# Patient Record
Sex: FEMALE | NOT HISPANIC OR LATINO | Employment: OTHER | URBAN - METROPOLITAN AREA
[De-identification: names, ages, dates, MRNs, and addresses within clinical notes are randomized per-mention and may not be internally consistent; named-entity substitution may affect disease eponyms.]

---

## 2020-01-06 ENCOUNTER — OFFICE VISIT (OUTPATIENT)
Dept: OBGYN | Facility: CLINIC | Age: 75
End: 2020-01-06
Attending: OBSTETRICS & GYNECOLOGY
Payer: COMMERCIAL

## 2020-01-06 VITALS
WEIGHT: 135 LBS | BODY MASS INDEX: 24.69 KG/M2 | HEART RATE: 76 BPM | SYSTOLIC BLOOD PRESSURE: 138 MMHG | DIASTOLIC BLOOD PRESSURE: 79 MMHG

## 2020-01-06 DIAGNOSIS — Z01.42 SURVEILLANCE PAP FOLLOWING ABNORMAL VAGINAL PAP: ICD-10-CM

## 2020-01-06 DIAGNOSIS — L98.9 DISORDER OF SKIN OF VULVA: Primary | ICD-10-CM

## 2020-01-06 PROBLEM — K29.91 GASTROINTESTINAL HEMORRHAGE ASSOCIATED WITH GASTRODUODENITIS: Status: ACTIVE | Noted: 2017-06-16

## 2020-01-06 PROBLEM — K63.3 ULCERATION OF COLON DETERMINED BY ENDOSCOPY: Status: ACTIVE | Noted: 2019-02-19

## 2020-01-06 PROBLEM — Z87.440 HISTORY OF RECURRENT UTI (URINARY TRACT INFECTION): Status: ACTIVE | Noted: 2019-02-19

## 2020-01-06 PROBLEM — E87.6 ACUTE HYPOKALEMIA: Status: ACTIVE | Noted: 2019-02-19

## 2020-01-06 PROBLEM — D36.9 ADENOMATOUS POLYP: Status: ACTIVE | Noted: 2019-02-23

## 2020-01-06 PROCEDURE — 87624 HPV HI-RISK TYP POOLED RSLT: CPT | Performed by: OBSTETRICS & GYNECOLOGY

## 2020-01-06 PROCEDURE — 88175 CYTOPATH C/V AUTO FLUID REDO: CPT | Performed by: OBSTETRICS & GYNECOLOGY

## 2020-01-06 PROCEDURE — 88305 TISSUE EXAM BY PATHOLOGIST: CPT | Performed by: OBSTETRICS & GYNECOLOGY

## 2020-01-06 PROCEDURE — 56606 BIOPSY OF VULVA/PERINEUM: CPT | Mod: ZF | Performed by: OBSTETRICS & GYNECOLOGY

## 2020-01-06 PROCEDURE — G0463 HOSPITAL OUTPT CLINIC VISIT: HCPCS | Mod: ZF

## 2020-01-06 PROCEDURE — 56605 BIOPSY OF VULVA/PERINEUM: CPT | Mod: ZF | Performed by: OBSTETRICS & GYNECOLOGY

## 2020-01-06 RX ORDER — HYDROCHLOROTHIAZIDE 25 MG/1
25 TABLET ORAL DAILY
COMMUNITY

## 2020-01-06 NOTE — NURSING NOTE
Chief Complaint   Patient presents with     Establish Care     C/O possible white lesions on vulva   Pam John LPN

## 2020-01-06 NOTE — PROGRESS NOTES
Progress Note    SUBJECTIVE:  Erika Kelly is an 74 year old female with T12 paraplegia 6 years ago from [facility] with neurogenic bladder, recurrent UTI's with self catheterization. She reports noting some blood and feeling raw in the urethral area in the last 1 1/2 years; she has been going to 's for this and now wants to transfer care to the [facility].  She was on Estrogen cream (now discontinued), Hiprex and a vaseline type product.  She saw ID for this in June who recommend abx and Vit C, cranberry and estrogen cream and has been doing well since October (no UTI's). She sometimes has difficulty finding her urethral orifice with the catheter.  However she noted a white patch on her vulva that she was concerned about.   She is s/p cone bx x2 and RACHANA.     Last    Lab Results   Component Value Date    PAP LSIL 07/06/2011   Pap 2011: LSIL cannot exlude HGSIL.  Colpo 2011: vaginal bx: benign squamous epithelium.  She does not remember this or any follow up to this.         HISTORY:  Prescription Medications as of 1/6/2020       Rx Number Disp Refills Start End Last Dispensed Date Next Fill Date Owning Pharmacy    calcium carbonate (OS-ROSIE 500 MG [place]. [place]) 500 MG tablet            Sig: Take 500 mg by mouth 2 times daily    Class: Historical    Route: Oral    Cholecalciferol (VITAMIN D PO)            Sig: Take 1 tablet by mouth daily.    Class: Historical    Route: Oral    GABAPENTIN PO            Sig: Take 100 mg by mouth 3 times daily    Class: Historical    Route: Oral    methenamine hippurate (HIPREX) 1 G TABS            Sig: Take 1 g by mouth 2 times daily    Class: Historical    Route: Oral    WARFARIN SODIUM PO            Sig: Take 2 mg by mouth 2 times daily    Class: Historical    Route: Oral        No Known Allergies    There is no immunization history on file for this patient.    OB History   No obstetric history on file.     No past medical history on file.  Past Surgical History:   Procedure Laterality Date      BACK SURGERY  2014     CONIZATION       HYSTERECTOMY, RACHANA       Family History   Problem Relation Age of Onset     Diabetes Son      Cancer Mother      Social History     Socioeconomic History     Marital status: Single     Spouse name: Not on file     Number of children: Not on file     Years of education: Not on file     Highest education level: Not on file   Occupational History     Not on file   Social Needs     Financial resource strain: Not on file     Food insecurity:     Worry: Not on file     Inability: Not on file     Transportation needs:     Medical: Not on file     Non-medical: Not on file   Tobacco Use     Smoking status: Never Smoker     Smokeless tobacco: Never Used   Substance and Sexual Activity     Alcohol use: No     Drug use: No     Sexual activity: Not on file   Lifestyle     Physical activity:     Days per week: Not on file     Minutes per session: Not on file     Stress: Not on file   Relationships     Social connections:     Talks on phone: Not on file     Gets together: Not on file     Attends Jew service: Not on file     Active member of club or organization: Not on file     Attends meetings of clubs or organizations: Not on file     Relationship status: Not on file     Intimate partner violence:     Fear of current or ex partner: Not on file     Emotionally abused: Not on file     Physically abused: Not on file     Forced sexual activity: Not on file   Other Topics Concern     Parent/sibling w/ CABG, MI or angioplasty before 65F 55M? Not Asked   Social History Narrative    ** Merged History Encounter **            ROS: Hair loss high blood pressure flank pain tremors stiffness rosacea    EXAM:  Blood pressure 138/79, pulse 76, weight 61.2 kg (135 lb), not currently breastfeeding. Body mass index is 24.69 kg/m .  General - pleasant female in no acute distress.      Pelvic Exam:  EG/BUS: very atrophic, cobblestone type white patches on inner right labia minora,  Ulceration to left  "of urethra. Linear white patch to left of ulcer.   Urethral meatus: did not visualize well  Urethra: no masses, tenderness, or scarring   Bladder: no masses or tenderness   Vagina: atrophic, thin, dry, narrow introitus, pediatric speculum used. Cuff and vagina Palpates normal  Cervix: surgically absent  Uterus: surgically absent  Adnexa: no masses      ASSESSMENT:  Encounter Diagnoses   Name Primary?     Surveillance Pap following abnormal vaginal Pap      Disorder of skin of vulva Yes    Apparently no follow up after abnormal vaginal pap  Ulceration may be from difficulty with self cath.    PLAN:   Orders Placed This Encounter   Procedures     Biopsy Vulva/Perineum, One Lesion [[id number]]     Biopsy Vulva/Perineum, Each Additional Lesion [[id number]]     Pap imaged thin layer diagnostic with HPV     HPV High Risk Types DNA Cervical     Surgical pathology exam                  Vulvar Punch Biopsy Procedure:    Time Out - \"Pause for the Cause\"  Just before the procedure begins, through verbal and active participation of team members, verify:   Initials   Patient Name erick   Patient  erick   Procedure to be performed erick     Preoperative Diagnosis: vulvar disorder   Postoperative Diagnosis:  same    Consent:   Risks,. Written consent signed and scanned    Skin Preparation:  Betadine to left lesion    Anesthesia:none      A 3 mm punch biopsy obtained at left urethra and left of ulcer.  A second biopsy to right of urethra performed. Specimens were placed in labeled Formalin Jar.    EBL:  1ml  Stasis achieved with silver nitrate and pressure. Complications:  None    Specimens sent to Pathology.  Will call with pathology results and recommended follow-up.     Tolerance of Procedure: Patient did tolerate the procedure well.       "

## 2020-01-06 NOTE — LETTER
1/6/2020       RE: Erika Kelly  [address] [place] [address]     Dear Colleague,    Thank you for referring your patient, Erika Kelly, to the [facility] at [facility]. Please see a copy of my visit note below.    Progress Note    SUBJECTIVE:  Erika Kelly is an 74 year old female with T12 paraplegia 6 years ago from [facility] with neurogenic bladder, recurrent UTI's with self catheterization. She reports noting some blood and feeling raw in the urethral area in the last 1 1/2 years; she has been going to 's for this and now wants to transfer care to the [facility].  She was on Estrogen cream (now discontinued), Hiprex and a vaseline type product.  She saw ID for this in June who recommend abx and Vit C, cranberry and estrogen cream and has been doing well since October (no UTI's). She sometimes has difficulty finding her urethral orifice with the catheter.  However she noted a white patch on her vulva that she was concerned about.   She is s/p cone bx x2 and RACHANA.     Last    Lab Results   Component Value Date    PAP LSIL 07/06/2011   Pap 2011: LSIL cannot exlude HGSIL.  Colpo 2011: vaginal bx: benign squamous epithelium.  She does not remember this or any follow up to this.         HISTORY:  Prescription Medications as of 1/6/2020       Rx Number Disp Refills Start End Last Dispensed Date Next Fill Date Owning Pharmacy    calcium carbonate (OS-ROSIE 500 MG [place]. [place]) 500 MG tablet            Sig: Take 500 mg by mouth 2 times daily    Class: Historical    Route: Oral    Cholecalciferol (VITAMIN D PO)            Sig: Take 1 tablet by mouth daily.    Class: Historical    Route: Oral    GABAPENTIN PO            Sig: Take 100 mg by mouth 3 times daily    Class: Historical    Route: Oral    methenamine hippurate (HIPREX) 1 G TABS            Sig: Take 1 g by mouth 2 times daily    Class: Historical    Route: Oral    WARFARIN SODIUM PO             Sig: Take 2 mg by mouth 2 times daily    Class: Historical    Route: Oral        No Known Allergies    There is no immunization history on file for this patient.    OB History   No obstetric history on file.     No past medical history on file.  Past Surgical History:   Procedure Laterality Date     BACK SURGERY  2014     CONIZATION       HYSTERECTOMY, RACHANA       Family History   Problem Relation Age of Onset     Diabetes Son      Cancer Mother      Social History     Socioeconomic History     Marital status: Single     Spouse name: Not on file     Number of children: Not on file     Years of education: Not on file     Highest education level: Not on file   Occupational History     Not on file   Social Needs     Financial resource strain: Not on file     Food insecurity:     Worry: Not on file     Inability: Not on file     Transportation needs:     Medical: Not on file     Non-medical: Not on file   Tobacco Use     Smoking status: Never Smoker     Smokeless tobacco: Never Used   Substance and Sexual Activity     Alcohol use: No     Drug use: No     Sexual activity: Not on file   Lifestyle     Physical activity:     Days per week: Not on file     Minutes per session: Not on file     Stress: Not on file   Relationships     Social connections:     Talks on phone: Not on file     Gets together: Not on file     Attends Christianity service: Not on file     Active member of club or organization: Not on file     Attends meetings of clubs or organizations: Not on file     Relationship status: Not on file     Intimate partner violence:     Fear of current or ex partner: Not on file     Emotionally abused: Not on file     Physically abused: Not on file     Forced sexual activity: Not on file   Other Topics Concern     Parent/sibling w/ CABG, MI or angioplasty before 65F 55M? Not Asked   Social History Narrative    ** Merged History Encounter **          ROS: Hair loss high blood pressure flank pain tremors stiffness  "rosacea    EXAM:  Blood pressure 138/79, pulse 76, weight 61.2 kg (135 lb), not currently breastfeeding. Body mass index is 24.69 kg/m .  General - pleasant female in no acute distress.      Pelvic Exam:  EG/BUS: very atrophic, cobblestone type white patches on inner right labia minora,  Ulceration to left of urethra. Linear white patch to left of ulcer.   Urethral meatus: did not visualize well  Urethra: no masses, tenderness, or scarring   Bladder: no masses or tenderness   Vagina: atrophic, thin, dry, narrow introitus, pediatric speculum used. Cuff and vagina Palpates normal  Cervix: surgically absent  Uterus: surgically absent  Adnexa: no masses      ASSESSMENT:  Encounter Diagnoses   Name Primary?     Surveillance Pap following abnormal vaginal Pap      Disorder of skin of vulva Yes    Apparently no follow up after abnormal vaginal pap  Ulceration may be from difficulty with self cath.    PLAN:   Orders Placed This Encounter   Procedures     Biopsy Vulva/Perineum, One Lesion [[id number]]     Biopsy Vulva/Perineum, Each Additional Lesion [[id number]]     Pap imaged thin layer diagnostic with HPV     HPV High Risk Types DNA Cervical     Surgical pathology exam      Vulvar Punch Biopsy Procedure:    Time Out - \"Pause for the Cause\"  Just before the procedure begins, through verbal and active participation of team members, verify:   Initials   Patient Name erick   Patient  erick   Procedure to be performed erick     Preoperative Diagnosis: vulvar disorder   Postoperative Diagnosis:  same    Consent:   Risks,. Written consent signed and scanned    Skin Preparation:  Betadine to left lesion    Anesthesia:none      A 3 mm punch biopsy obtained at left urethra and left of ulcer.  A second biopsy to right of urethra performed. Specimens were placed in labeled Formalin Jar.    EBL:  1ml  Stasis achieved with silver nitrate and pressure. Complications:  None    Specimens sent to Pathology.  Will call with pathology results and " recommended follow-up.     Tolerance of Procedure: Patient did tolerate the procedure well.     Lexi Taylor MD

## 2020-01-08 LAB — COPATH REPORT: NORMAL

## 2020-01-11 LAB
COPATH REPORT: ABNORMAL
PAP: ABNORMAL

## 2020-01-14 LAB
FINAL DIAGNOSIS: NORMAL
HPV HR 12 DNA CVX QL NAA+PROBE: NEGATIVE
HPV16 DNA SPEC QL NAA+PROBE: NEGATIVE
HPV18 DNA SPEC QL NAA+PROBE: NEGATIVE
SPECIMEN DESCRIPTION: NORMAL
SPECIMEN SOURCE CVX/VAG CYTO: NORMAL

## 2020-01-16 DIAGNOSIS — N94.89: ICD-10-CM

## 2020-01-16 DIAGNOSIS — R87.629 VAGINAL PAP SMEAR, ABNORMAL: Primary | ICD-10-CM

## 2020-01-16 NOTE — PROGRESS NOTES
I called the pt. today to inform her of   Her abnormal vaginal Pap smear which had remained no worse than her prior vaginal Pap smear.  I would like to refer her to GYN oncology to get their input as to how to manage this.  Order placed.  In terms of her sam-urethral ulcer from self caths I would like to refer her to the wound clinic.    I did tell her that her vulvar biopsy result returned possible  Lichen sclerosis.  This visually did not appear to be lichen sclerosis.  I suggested that we avoid any topical treatment at this time and she should return yearly to reexamine this.

## 2020-01-29 NOTE — TELEPHONE ENCOUNTER
ONCOLOGY INTAKE: Records Information      APPT INFORMATION: [id number] Dr. Job Wick  Referring provider:  Dr. Lexi Taylor  Referring provider s clinic:  [facility]  Reason for visit/diagnosis:  Vaginal Pap smear, abnormal [R87.659]  Has patient been notified of appointment date and time?: Yes    RECORDS INFORMATION:  Were the records received with the referral (via [employer])? Internal Referral    Has patient been seen for any external appt for this diagnosis? No    If yes, where? NA    Has patient had any imaging or procedures outside of [place]  [place] for this condition? No      If Yes, where? NA    ADDITIONAL INFORMATION:  None

## 2020-01-30 NOTE — TELEPHONE ENCOUNTER
RECORDS STATUS - ALL OTHER DIAGNOSIS      RECORDS RECEIVED FROM: [facility] - Internal Referral   DATE RECEIVED: 1/30/20   NOTES STATUS DETAILS   OFFICE NOTE from referring provider Lexi Knutson MD   OFFICE NOTE from medical oncologist     DISCHARGE SUMMARY from hospital     DISCHARGE REPORT from the ER     OPERATIVE REPORT     MEDICATION LIST     CLINICAL TRIAL TREATMENTS TO DATE     LABS     PATHOLOGY REPORTS [facility]  1/6/20   ANYTHING RELATED TO DIAGNOSIS Epic 1/6/20   GENONOMIC TESTING     TYPE:     IMAGING (NEED IMAGES & REPORT)     CT SCANS     MRI     MAMMO     ULTRASOUND     PET

## 2020-02-19 ENCOUNTER — ONCOLOGY VISIT (OUTPATIENT)
Dept: ONCOLOGY | Facility: CLINIC | Age: 75
End: 2020-02-19
Attending: OBSTETRICS & GYNECOLOGY
Payer: COMMERCIAL

## 2020-02-19 ENCOUNTER — PRE VISIT (OUTPATIENT)
Dept: ONCOLOGY | Facility: CLINIC | Age: 75
End: 2020-02-19

## 2020-02-19 VITALS
HEART RATE: 72 BPM | TEMPERATURE: 97.7 F | DIASTOLIC BLOOD PRESSURE: 88 MMHG | RESPIRATION RATE: 16 BRPM | SYSTOLIC BLOOD PRESSURE: 143 MMHG | OXYGEN SATURATION: 99 %

## 2020-02-19 DIAGNOSIS — R87.629 VAGINAL PAP SMEAR, ABNORMAL: ICD-10-CM

## 2020-02-19 PROCEDURE — G0463 HOSPITAL OUTPT CLINIC VISIT: HCPCS | Mod: ZF

## 2020-02-19 PROCEDURE — 99205 OFFICE O/P NEW HI 60 MIN: CPT | Mod: 25 | Performed by: OBSTETRICS & GYNECOLOGY

## 2020-02-19 PROCEDURE — 88305 TISSUE EXAM BY PATHOLOGIST: CPT | Performed by: OBSTETRICS & GYNECOLOGY

## 2020-02-19 PROCEDURE — 11105 PUNCH BX SKIN EA SEP/ADDL: CPT

## 2020-02-19 PROCEDURE — 11105 PUNCH BX SKIN EA SEP/ADDL: CPT | Performed by: OBSTETRICS & GYNECOLOGY

## 2020-02-19 PROCEDURE — 11104 PUNCH BX SKIN SINGLE LESION: CPT

## 2020-02-19 PROCEDURE — 11104 PUNCH BX SKIN SINGLE LESION: CPT | Performed by: OBSTETRICS & GYNECOLOGY

## 2020-02-19 ASSESSMENT — PAIN SCALES - GENERAL: PAINLEVEL: EXTREME PAIN (8)

## 2020-02-19 NOTE — Clinical Note
2/19/2020       RE: Erika Kelly  [address] [place] [address]     Dear Colleague,    Thank you for referring your patient, Erika Kelly, to the [facility]. Please see a copy of my visit note below.    No notes on file    Again, thank you for allowing me to participate in the care of your patient.      Sincerely,    Job Wick MD
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
## 2020-02-19 NOTE — NURSING NOTE
Prior to the start of the procedure and with procedural staff participation, I verbally confirmed the patient s identity using two indicators, relevant allergies, that the procedure was appropriate and matched the consent or emergent situation, and that the correct equipment/implants were available. Immediately prior to starting the procedure I conducted the Time Out with the procedural staff and re-confirmed the patient s name, procedure, and site/side. (The Joint Commission universal protocol was followed.)  Yes    Sedation (Moderate or Deep): None    Post procedure pain: 0    Ivette Beyer RN

## 2020-02-19 NOTE — NURSING NOTE
"Oncology Rooming Note    February 19, 2020 11:48 AM   Erika Kelly is a 74 year old female who presents for:    Chief Complaint   Patient presents with     Oncology Clinic Visit     New; Abnormal Pap     Initial Vitals: BP (!) 143/88   Pulse 72   Temp 97.7  F (36.5  C) (Oral)   Resp 16   SpO2 99%  Estimated body mass index is 24.69 kg/m  as calculated from the following:    Height as of 9/4/14: 1.575 m (5' 2\").    Weight as of 1/6/20: 61.2 kg (135 lb). There is no height or weight on file to calculate BSA.  Extreme Pain (8) Comment: all over body pain   No LMP recorded. Patient has had a hysterectomy.  Allergies reviewed: Yes  Medications reviewed: Yes    Medications: Medication refills not needed today.  Pharmacy name entered into [employer]: [id number] [address] AT [facility]    Clinical concerns: abnormal pap       Yolanda Myers CMA              "

## 2020-02-20 ENCOUNTER — PATIENT OUTREACH (OUTPATIENT)
Dept: CARE COORDINATION | Facility: CLINIC | Age: 75
End: 2020-02-20

## 2020-02-20 LAB — COPATH REPORT: NORMAL

## 2020-02-20 NOTE — PROGRESS NOTES
Oncology Distress Screening Follow-up  Clinical Social Work  [facility]    Identified Concern and Score From Distress Screenin. How concerned are you about knowing what resources are available to help you?   5  patient would like to speak to socail worker about disability           9. If you want to be contacted by one of our professionals, I can send a message to them right now.   Oncology Social  Worker                 Date of Distress Screenin2020      Data: Erika Kelyl 74 year old woman with a recent diagnosis of LSIL and possible lichen sclerosis.  Given these findings she was subsequently sent to the Gynecologic Cancer Clinic for new patient consultation.       Intervention/Education Provided:: Patient requested assistance and information about applying for disability. Patient explained their circumstances for wanting to apply for disability.  provided supportive listening and validation of patient's feelings and experiences. Patient also expressed interest in home health care and described the assistance they were seeking.  Per patient's request,  provided patient with phone numbers to cancer legal line, disability linkage line, senior linkage line an talked with patient about calling their insurance provider about coverage they provide for home health care.  also encourage patient to follow up with their doctor about prescribing home health care order based on the needs they are describing. Patient plans to call phone numbers  provided and has 's contact information for continued follow up as needed.       Follow-up Required:  will remain available as needed.       Gretel CASTANEDA, ABRAHAM  - Oncology  Phone : [phone]  Pager: [phone]

## 2020-03-03 ENCOUNTER — DOCUMENTATION ONLY (OUTPATIENT)
Dept: CARE COORDINATION | Facility: CLINIC | Age: 75
End: 2020-03-03

## 2020-03-13 ENCOUNTER — TELEPHONE (OUTPATIENT)
Dept: INTERNAL MEDICINE | Facility: CLINIC | Age: 75
End: 2020-03-13

## 2020-03-13 NOTE — TELEPHONE ENCOUNTER
Called the patient and assured we are doing everything to protect all patients and providers Jaqueline Littlejohn Paramedic on 3/13/2020 at 3:55 PM

## 2020-03-13 NOTE — TELEPHONE ENCOUNTER
Health Call Center    Phone Message    May a detailed message be left on voicemail: yes     Reason for Call: Other: This is a new pt, establishing with Dr Edward. She is a paraplegic and in a wheelchair. She is coming in to get an RX for catheters. She is leary of sitting in the waiting room as her health is compromised. Please call to discuss if there is anything she can do to avoid that. Thanks.    Action Taken: Message routed to:  [facility] med    Travel Screening: Not Applicable

## 2020-03-17 ENCOUNTER — RECORDS - HEALTHEAST (OUTPATIENT)
Dept: LAB | Facility: CLINIC | Age: 75
End: 2020-03-17

## 2020-03-19 LAB — BACTERIA SPEC CULT: ABNORMAL

## 2021-01-05 ENCOUNTER — TRANSFERRED RECORDS (OUTPATIENT)
Dept: HEALTH INFORMATION MANAGEMENT | Facility: CLINIC | Age: 76
End: 2021-01-05

## 2021-06-01 ENCOUNTER — RECORDS - HEALTHEAST (OUTPATIENT)
Dept: ADMINISTRATIVE | Facility: CLINIC | Age: 76
End: 2021-06-01

## 2021-08-23 ENCOUNTER — TRANSFERRED RECORDS (OUTPATIENT)
Dept: HEALTH INFORMATION MANAGEMENT | Facility: CLINIC | Age: 76
End: 2021-08-23

## 2021-08-23 ENCOUNTER — MEDICAL CORRESPONDENCE (OUTPATIENT)
Dept: HEALTH INFORMATION MANAGEMENT | Facility: CLINIC | Age: 76
End: 2021-08-23

## 2021-08-27 ENCOUNTER — TELEPHONE (OUTPATIENT)
Dept: OBGYN | Facility: CLINIC | Age: 76
End: 2021-08-27

## 2021-08-27 NOTE — TELEPHONE ENCOUNTER
Pt called stating that her urologist sent a referral for her vulva ulcer.  Pt wants to make an appt.  Transferred to  to schedule. No referral was found.

## 2021-10-27 ENCOUNTER — OFFICE VISIT (OUTPATIENT)
Dept: OBGYN | Facility: CLINIC | Age: 76
End: 2021-10-27
Attending: OBSTETRICS & GYNECOLOGY
Payer: COMMERCIAL

## 2021-10-27 VITALS — DIASTOLIC BLOOD PRESSURE: 85 MMHG | HEART RATE: 84 BPM | SYSTOLIC BLOOD PRESSURE: 125 MMHG

## 2021-10-27 DIAGNOSIS — N90.89 VULVAR LESION: Primary | ICD-10-CM

## 2021-10-27 PROCEDURE — 88305 TISSUE EXAM BY PATHOLOGIST: CPT | Mod: 26 | Performed by: PATHOLOGY

## 2021-10-27 PROCEDURE — 56605 BIOPSY OF VULVA/PERINEUM: CPT | Performed by: OBSTETRICS & GYNECOLOGY

## 2021-10-27 PROCEDURE — 99212 OFFICE O/P EST SF 10 MIN: CPT | Mod: 25 | Performed by: OBSTETRICS & GYNECOLOGY

## 2021-10-27 PROCEDURE — G0463 HOSPITAL OUTPT CLINIC VISIT: HCPCS

## 2021-10-27 PROCEDURE — 88305 TISSUE EXAM BY PATHOLOGIST: CPT | Mod: TC | Performed by: OBSTETRICS & GYNECOLOGY
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
## 2021-10-29 LAB
PATH REPORT.COMMENTS IMP SPEC: NORMAL
PATH REPORT.COMMENTS IMP SPEC: NORMAL
PATH REPORT.FINAL DX SPEC: NORMAL
PATH REPORT.GROSS SPEC: NORMAL
PATH REPORT.MICROSCOPIC SPEC OTHER STN: NORMAL
PATH REPORT.RELEVANT HX SPEC: NORMAL
PHOTO IMAGE: NORMAL

## 2021-11-05 ENCOUNTER — TELEPHONE (OUTPATIENT)
Dept: OBGYN | Facility: CLINIC | Age: 76
End: 2021-11-05

## 2021-11-05 DIAGNOSIS — N90.89 VULVAR LESION: Primary | ICD-10-CM

## 2021-11-05 NOTE — TELEPHONE ENCOUNTER
Called and reviewed vulvar biopsy results with Erika.  Given not diagnostic, recommend repeat evaluation with Gyn Oncology given appearance and persistence to help aid possible therapy (possible topical steroids given 2020 bx results).  MD Rosanna

## 2021-11-05 NOTE — TELEPHONE ENCOUNTER
----- Message from Ivette Jones RN sent at 11/5/2021  3:02 PM CDT -----  Regarding: Biopsy results  Has called twice, sent staff message to Dr. Villagran asking her to call at [phone].

## 2021-11-05 NOTE — TELEPHONE ENCOUNTER
Called pt to inform her that Dr Figueroa is in clinic but is currently seeing patients and will call her with results soon.  Pt verbalized understanding.

## 2021-12-08 ENCOUNTER — ONCOLOGY VISIT (OUTPATIENT)
Dept: ONCOLOGY | Facility: CLINIC | Age: 76
End: 2021-12-08
Attending: OBSTETRICS & GYNECOLOGY
Payer: COMMERCIAL

## 2021-12-08 VITALS — HEART RATE: 82 BPM | OXYGEN SATURATION: 98 % | DIASTOLIC BLOOD PRESSURE: 83 MMHG | SYSTOLIC BLOOD PRESSURE: 135 MMHG

## 2021-12-08 DIAGNOSIS — N76.2: Primary | ICD-10-CM

## 2021-12-08 PROCEDURE — 88305 TISSUE EXAM BY PATHOLOGIST: CPT | Mod: 26 | Performed by: PATHOLOGY

## 2021-12-08 PROCEDURE — 88312 SPECIAL STAINS GROUP 1: CPT | Mod: 26 | Performed by: PATHOLOGY

## 2021-12-08 PROCEDURE — G0463 HOSPITAL OUTPT CLINIC VISIT: HCPCS | Mod: 25

## 2021-12-08 PROCEDURE — G0463 HOSPITAL OUTPT CLINIC VISIT: HCPCS

## 2021-12-08 PROCEDURE — 56605 BIOPSY OF VULVA/PERINEUM: CPT | Performed by: OBSTETRICS & GYNECOLOGY

## 2021-12-08 PROCEDURE — 99215 OFFICE O/P EST HI 40 MIN: CPT | Mod: 25 | Performed by: OBSTETRICS & GYNECOLOGY

## 2021-12-08 PROCEDURE — 56605 BIOPSY OF VULVA/PERINEUM: CPT

## 2021-12-08 PROCEDURE — 88305 TISSUE EXAM BY PATHOLOGIST: CPT | Mod: TC | Performed by: OBSTETRICS & GYNECOLOGY
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
## 2021-12-08 NOTE — Clinical Note
12/8/2021         RE: Erika Kelly  [address] [place] [address]        Dear Colleague,    Thank you for referring your patient, Erika Kelly, to the [facility]. Please see a copy of my visit note below.    No notes on file    Again, thank you for allowing me to participate in the care of your patient.        Sincerely,        Job Wick MD

## 2021-12-08 NOTE — LETTER
12/8/2021       RE: Erika Kelly  [address] [place] [address]     Dear Colleague,    Thank you for referring your patient, Erika Kelly, to the [facility] CANCER [facility]INIC at [facility]. Please see a copy of my visit note below.    No notes on file    Again, thank you for allowing me to participate in the care of your patient.      Sincerely,    Job Wick MD

## 2021-12-08 NOTE — LETTER
12/8/2021      RE: Erika Kelly  [address]  [place] [address]       No notes on file    Job Wick MD

## 2021-12-08 NOTE — LETTER
12/8/2021       RE: Erika Kelly  [address] [place] [address]     Dear Colleague,    Thank you for referring your patient, Erika Kelly, to the [facility] CANCER CLINIC at [facility]. Please see a copy of my visit note below.    No notes on file    Again, thank you for allowing me to participate in the care of your patient.      Sincerely,    Job Wick MD

## 2021-12-08 NOTE — NURSING NOTE
"Oncology Rooming Note    December 8, 2021 3:02 PM   Erika Kelly is a 76 year old female who presents for:    Chief Complaint   Patient presents with     Oncology Clinic Visit     vulvar lesion     Initial Vitals: /83   Pulse 82   SpO2 98%  Estimated body mass index is 24.69 kg/m  as calculated from the following:    Height as of 9/4/14: 1.575 m (5' 2\").    Weight as of 1/6/20: 61.2 kg (135 lb). There is no height or weight on file to calculate BSA.  Data Unavailable Comment: Data Unavailable   No LMP recorded. Patient has had a hysterectomy.  Allergies reviewed: Yes  Medications reviewed: Yes    Medications: Medication refills not needed today.  Pharmacy name entered into [employer]: [id number] [address]    Clinical concerns: none       Gricel Tejada CMA            "

## 2021-12-08 NOTE — LETTER
12/8/2021      RE: Erika Kelly  [address]  [place] [address]       No notes on file    Job Wick MD

## 2021-12-08 NOTE — NURSING NOTE
Prior to the start of the procedure and with procedural staff participation, I verbally confirmed the patient s identity using two indicators, relevant allergies, that the procedure was appropriate and matched the consent or emergent situation, and that the correct equipment/implants were available. Immediately prior to starting the procedure I conducted the Time Out with the procedural staff and re-confirmed the patient s name, procedure, and site/side. (The Joint Commission universal protocol was followed.)  Yes    Sedation (Moderate or Deep): None    Post procedure pain: 0    COMPLETED   Bx Vulva (11 O'Clock Introitus)     Ivette Beyer RN

## 2021-12-13 LAB
PATH REPORT.COMMENTS IMP SPEC: NORMAL
PATH REPORT.COMMENTS IMP SPEC: NORMAL
PATH REPORT.FINAL DX SPEC: NORMAL
PATH REPORT.GROSS SPEC: NORMAL
PATH REPORT.MICROSCOPIC SPEC OTHER STN: NORMAL
PATH REPORT.MICROSCOPIC SPEC OTHER STN: NORMAL
PATH REPORT.RELEVANT HX SPEC: NORMAL
PHOTO IMAGE: NORMAL

## 2021-12-17 ENCOUNTER — PATIENT OUTREACH (OUTPATIENT)
Dept: ONCOLOGY | Facility: CLINIC | Age: 76
End: 2021-12-17
Payer: COMMERCIAL

## 2021-12-17 DIAGNOSIS — N76.2: Primary | ICD-10-CM

## 2021-12-18 NOTE — TELEPHONE ENCOUNTER
Patient called with biopsy results.     Urology referral placed and scheduling contacted    Ivette Beyer RN

## 2021-12-20 ENCOUNTER — PRE VISIT (OUTPATIENT)
Dept: UROLOGY | Facility: CLINIC | Age: 76
End: 2021-12-20
Payer: COMMERCIAL

## 2021-12-20 ENCOUNTER — TELEPHONE (OUTPATIENT)
Dept: UROLOGY | Facility: CLINIC | Age: 76
End: 2021-12-20
Payer: COMMERCIAL

## 2021-12-20 NOTE — TELEPHONE ENCOUNTER
[facility]    Phone Message    May a detailed message be left on voicemail: yes     Reason for Call: Appointment Intake    Referring Provider Name: Job Wick MD   Diagnosis and/or Symptoms: Patient self caths and is having terrible skin break down      Unsure who to schedule with, please call pt thank you    Action Taken: Message routed to:  [facility] uro    Travel Screening: Not Applicable

## 2021-12-20 NOTE — TELEPHONE ENCOUNTER
Patient called and left message video appt this week on Wednesday at 9:30am with esthela Foley LPN Staff Nurse

## 2021-12-20 NOTE — CONFIDENTIAL NOTE
Reason for visit: consult     Relevant information: vulvar inflammation    Records/imaging/labs/orders: in epic    Pt called: n/a    At Rooming: virtual

## 2021-12-22 ENCOUNTER — VIRTUAL VISIT (OUTPATIENT)
Dept: UROLOGY | Facility: CLINIC | Age: 76
End: 2021-12-22
Payer: COMMERCIAL

## 2021-12-22 DIAGNOSIS — N31.9 NEUROGENIC BLADDER: Primary | ICD-10-CM

## 2021-12-22 DIAGNOSIS — N76.2: ICD-10-CM

## 2021-12-22 DIAGNOSIS — N76.3 CHRONIC VULVITIS: ICD-10-CM

## 2021-12-22 PROCEDURE — 99203 OFFICE O/P NEW LOW 30 MIN: CPT | Mod: 95 | Performed by: PHYSICIAN ASSISTANT

## 2021-12-22 NOTE — PROGRESS NOTES
Erika is a 76 year old who is being evaluated via a billable video visit.      How would you like to obtain your AVS? Mail a copy  If the video visit is dropped, the invitation should be resent by: Text to cell phone: [phone]  Will anyone else be joining your video visit? No      Video Start Time: 2:35 PM  Video-Visit Details    Type of service:  Telephone Visit.  Despite multiple attempts to connect video through [employer] or [employer], the patient was unable to establish a video connection.  The visit was subsequently performed via telephone call.     Video End Time:2:49 PM    Originating Location (pt. Location): Home    Distant Location (provider location):  [facility] UROLOGY [facility]     Platform used for Video Visit: [employer]

## 2021-12-22 NOTE — PATIENT INSTRUCTIONS
UROLOGY CLINIC VISIT PATIENT INSTRUCTIONS    - Schedule UDS next available.   - Schedule cystoscopy next available with Dr. Napoles or Dr. Cruz for neurogenic bladder.      If you have any issues, questions or concerns in the meantime, do not hesitate to contact us at [phone] or via [employer].     It was a pleasure meeting with you today.  Thank you for allowing me and my team the privilege of caring for you today.  YOU are the reason we are here, and I truly hope we provided you with the excellent service you deserve.  Please let us know if there is anything else we can do for you so that we can be sure you are leaving completely satisfied with your care experience.    Jenny Patino PA-C  Department of Urology
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
## 2021-12-27 ENCOUNTER — TELEPHONE (OUTPATIENT)
Dept: UROLOGY | Facility: CLINIC | Age: 76
End: 2021-12-27
Payer: COMMERCIAL

## 2021-12-27 NOTE — TELEPHONE ENCOUNTER
LVM for patient to Schedule UDS next available.   - Schedule cystoscopy next available with Dr. Napoles or Dr. Cruz for neurogenic bladder.

## 2022-01-10 ENCOUNTER — TELEPHONE (OUTPATIENT)
Dept: UROLOGY | Facility: CLINIC | Age: 77
End: 2022-01-10
Payer: COMMERCIAL

## 2022-01-10 NOTE — TELEPHONE ENCOUNTER
Pt called, she in concerned about vulvar bleeding.     Message routed to Jenny Patino PA-C.    Message routed to scheduling team to set up UDS and cystoscopy.    Josette Garcia CMA  1/10/2022  2:44 PM
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
## 2022-01-10 NOTE — TELEPHONE ENCOUNTER
[facility] Call Center    Phone Message    May a detailed message be left on voicemail: yes     Reason for Call: Other: Pt calling back to see which should be done first.. UDS or Cystoscopy.  Pt feels the cysto should be done first.  Please give her a call to discuss     Action Taken: Message routed to:  [facility] uro    Travel Screening: Not Applicable

## 2022-01-10 NOTE — TELEPHONE ENCOUNTER
Pt called and notified of the below.    Josette Garcia, YENNY  1/10/2022  4:59 PM        In addition to the UDS, she also needs a cystoscopy with either Dr. Napoles or Dr. Cruz for neurogenic bladder.      In regards to her vulvar bleeding, I would recommend she follow up with GYN or oncology GYN, as she has been followed closely by both providers for chronic vulvar inflammation and irritation, and has undergone biopsies in the past.  I would recommend she see GYN as they have been managing her vulvar bleeding and irritation.    JACQUELINE Coon on 1/10/2022 at 4:01 PM

## 2022-02-10 ENCOUNTER — PRE VISIT (OUTPATIENT)
Dept: UROLOGY | Facility: CLINIC | Age: 77
End: 2022-02-10
Payer: COMMERCIAL

## 2022-03-18 ENCOUNTER — TELEPHONE (OUTPATIENT)
Dept: OBGYN | Facility: CLINIC | Age: 77
End: 2022-03-18
Payer: COMMERCIAL

## 2022-03-18 NOTE — TELEPHONE ENCOUNTER
----- Message from Valeria Lopez RN sent at 3/18/2022  1:51 PM CDT -----  Regarding: what is this appt for?

## 2022-03-21 ENCOUNTER — TELEPHONE (OUTPATIENT)
Dept: OBGYN | Facility: CLINIC | Age: 77
End: 2022-03-21
Payer: COMMERCIAL

## 2022-03-21 NOTE — TELEPHONE ENCOUNTER
----- Message from Valeria Lopez RN sent at 3/21/2022  1:49 PM CDT -----  Regarding: what is this appt for?

## 2022-03-21 NOTE — TELEPHONE ENCOUNTER
Reviewed that this referral is for the vulvar lesion.  She states that she has already had 2 biopsies and doesn't know what else could be done.  She states that she is paraplegic and she has to self cath, which is when she noted lesion.  She is wondering if she needs estrogen or other treatment.    Advised that I can check to see if phone visit would be appropriate.    She declines, and would prefer in person visit as scheduled.

## 2022-03-23 ENCOUNTER — OFFICE VISIT (OUTPATIENT)
Dept: OBGYN | Facility: CLINIC | Age: 77
End: 2022-03-23
Attending: OBSTETRICS & GYNECOLOGY
Payer: COMMERCIAL

## 2022-03-23 VITALS
BODY MASS INDEX: 24.69 KG/M2 | SYSTOLIC BLOOD PRESSURE: 126 MMHG | DIASTOLIC BLOOD PRESSURE: 75 MMHG | HEIGHT: 62 IN | HEART RATE: 73 BPM

## 2022-03-23 DIAGNOSIS — N95.2 VAGINAL ATROPHY: Primary | ICD-10-CM

## 2022-03-23 PROCEDURE — 99214 OFFICE O/P EST MOD 30 MIN: CPT | Performed by: OBSTETRICS & GYNECOLOGY

## 2022-03-23 PROCEDURE — G0463 HOSPITAL OUTPT CLINIC VISIT: HCPCS

## 2022-03-23 RX ORDER — ESTRADIOL 0.1 MG/G
2 CREAM VAGINAL DAILY
Qty: 42.5 G | Refills: 3 | Status: SHIPPED | OUTPATIENT
Start: 2022-03-23 | End: 2022-03-25
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
## 2022-03-25 DIAGNOSIS — N95.2 VAGINAL ATROPHY: ICD-10-CM

## 2022-03-25 RX ORDER — ESTRADIOL 0.1 MG/G
2 CREAM VAGINAL DAILY
Qty: 42.5 G | Refills: 3 | Status: SHIPPED | OUTPATIENT
Start: 2022-03-25

## 2022-04-19 ENCOUNTER — TELEPHONE (OUTPATIENT)
Dept: UROLOGY | Facility: CLINIC | Age: 77
End: 2022-04-19
Payer: COMMERCIAL

## 2022-04-19 DIAGNOSIS — N31.9 NEUROGENIC BLADDER: Primary | ICD-10-CM

## 2022-04-19 DIAGNOSIS — R33.9 URINARY RETENTION: ICD-10-CM

## 2022-04-19 NOTE — PROGRESS NOTES
Detailed voicemail left for patient today.  Per Deneen Rodriguez PA-C, patient will need a UA/UC this week to rule out UTI prior to her Urodynamics Study on 4/28.  Orders placed.  I asked the patient to please have this done at [facility] facility so we can get the lab results.  She is to call back with any questions or concerns.  Will watch for results.    Yolanda Schultz, CMA

## 2022-04-19 NOTE — TELEPHONE ENCOUNTER
Spoke with patient to clarify.  She understands that we are requesting she do a urine sample this week.  There has been no mention of any significant findings of any kind.  Patient misunderstood.  Patient stated she will go to a [facility] by the end of the week to do the sample.  Will watch for results.    Yolanda Schultz, CMA

## 2022-04-19 NOTE — TELEPHONE ENCOUNTER
[facility] Call Center    Phone Message    May a detailed message be left on voicemail: yes     Reason for Call: Other: Pt called and stated that she got a call regarding significant findings and then it cut out, pt is wondering what the significant findings were and would like clarifications on it. Please call pt back to further discuss, Thanks!    Action Taken: Message routed to:  [facility] Uro    Travel Screening: Not Applicable

## 2022-04-21 ENCOUNTER — LAB (OUTPATIENT)
Dept: LAB | Facility: CLINIC | Age: 77
End: 2022-04-21
Payer: COMMERCIAL

## 2022-04-21 ENCOUNTER — PRE VISIT (OUTPATIENT)
Dept: UROLOGY | Facility: CLINIC | Age: 77
End: 2022-04-21
Payer: COMMERCIAL

## 2022-04-21 DIAGNOSIS — R33.9 URINARY RETENTION: ICD-10-CM

## 2022-04-21 LAB
ALBUMIN UR-MCNC: NEGATIVE MG/DL
APPEARANCE UR: CLEAR
BACTERIA #/AREA URNS HPF: ABNORMAL /HPF
BILIRUB UR QL STRIP: NEGATIVE
COLOR UR AUTO: YELLOW
GLUCOSE UR STRIP-MCNC: NEGATIVE MG/DL
HGB UR QL STRIP: NEGATIVE
HYALINE CASTS #/AREA URNS LPF: ABNORMAL /LPF
KETONES UR STRIP-MCNC: NEGATIVE MG/DL
LEUKOCYTE ESTERASE UR QL STRIP: NEGATIVE
NITRATE UR QL: NEGATIVE
PH UR STRIP: 6 [PH] (ref 5–8)
RBC #/AREA URNS AUTO: ABNORMAL /HPF
SP GR UR STRIP: 1.02 (ref 1–1.03)
SQUAMOUS #/AREA URNS AUTO: ABNORMAL /LPF
UROBILINOGEN UR STRIP-ACNC: 0.2 E.U./DL
WBC #/AREA URNS AUTO: ABNORMAL /HPF

## 2022-04-21 PROCEDURE — 87086 URINE CULTURE/COLONY COUNT: CPT

## 2022-04-21 PROCEDURE — 81001 URINALYSIS AUTO W/SCOPE: CPT

## 2022-04-23 LAB — BACTERIA UR CULT: NO GROWTH

## 2022-04-28 ENCOUNTER — ALLIED HEALTH/NURSE VISIT (OUTPATIENT)
Dept: UROLOGY | Facility: CLINIC | Age: 77
End: 2022-04-28
Payer: COMMERCIAL

## 2022-04-28 DIAGNOSIS — N76.3 CHRONIC VULVITIS: Primary | ICD-10-CM

## 2022-04-28 PROCEDURE — 99207 PR NO CHARGE LOS: CPT | Performed by: PHYSICIAN ASSISTANT

## 2022-04-28 NOTE — PROGRESS NOTES
"Urology Brief Note    Patient presented to urology clinic today for urodynamics studies as ordered by Jenny Patino PA-C. After reviewing details of the procedure prior to obtaining consent, the patient verbalized confusion as to why urodynamics is needed. 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 Note also reports chronic pain with CIC due to vulvar inflammation and irritation and recommendation was to proceed with UDS and cystoscopy as further workup. Today, Erika denies pain with catheterization (she is paraplegic and has decreased sensation in her groin). She previously had issues with frequent UTIs but has not had any recently. UA/UC last week negative for UTI. She also denies gross hematuria or difficulty catheterizing.     Discussed that we could defer urodynamics today if she prefers since she has no particular urologic concerns at this time. She agrees and would like to cancel. Asked if she would like to keep her cystoscopy appointment with Dr. Lemon as scheduled next week, and she would like to cancel this as well given no gross hematuria, recent UTI, or pain/difficulty with catheterization.     Her primary concerns are whitish lesions and a possible ulcer on her vulvar area. She has been seen by OB/GYN and GYN ONC for these issues previously and review of notes indicates a history of LSIL and possible lichen sclerosis. She has had multiple biopsies of these sites which revealed possible LS and epidermal hyperplasia but no dysplasia. She has previously been prescribed topical estrogen cream and used this with some benefit, but she stopped after a few weeks due to concern for possible side effects (\"can cause cancer\"). Offered to complete pelvic exam today, but patient declines. Discussed that if her primary concerns are vulvar lesions, she would be best served by following " up with GYN or GYN ONC. She has an appointment in the women's health clinic 5/4/22. She requested to see someone else for second opinion. Will ask clinic coordinators to assist patient to schedule.     She can follow up with urology as needed. Recommend at least annual visits for routine neurogenic bladder surveillance with renal ultrasound. She can follow up sooner with recurrence of UTIs, urinary incontinence, difficulty catheterizing, gross hematuria, or other urologic issues.    Thank you for allowing me to participate in the care of Ms. Erika Kelly and please don't hesitate to contact me with any questions or concerns.      Deneen Rodriguez PA-C  Urology Physician Assistant

## 2022-04-28 NOTE — PATIENT INSTRUCTIONS
UROLOGY CLINIC VISIT PATIENT INSTRUCTIONS    Cancel the cystoscopy with Dr. Lemon on 5/5/22 (per patient request).    Schedule an appointment with someone in GYN or GYN ONC for a second opinion on your lichen sclerosis and other vulvar concerns.     If you have any issues, questions or concerns in the meantime, do not hesitate to contact us at [phone] or via [employer].     It was a pleasure meeting with you today.  Thank you for allowing me and my team the privilege of caring for you today.  YOU are the reason we are here, and I truly hope we provided you with the excellent service you deserve.  Please let us know if there is anything else we can do for you so that we can be sure you are leaving completely satisfied with your care experience.

## 2022-05-02 ENCOUNTER — TELEPHONE (OUTPATIENT)
Dept: UROLOGY | Facility: CLINIC | Age: 77
End: 2022-05-02
Payer: COMMERCIAL

## 2022-05-02 NOTE — CONFIDENTIAL NOTE
Called the patient to let her know that Dr. Hastings doesn't see patients for vulvar lesions and suggested that she see her OB/Gyn on 5/4. She does not really want to see her OB/Gyn and she is tired of being thrown back and forth between OB/Gyn and Urology. She was wondering if Dr. Hastings knew or recommend any specific providers who would for vulvar lesions. Sent an in-basket message to Dr. Hastings and let the patient know that she will be contacted as soon as there is an answer.

## 2022-05-02 NOTE — CONFIDENTIAL NOTE
Left voicemail informing that Dr. Hastings did not have any providers that he would recommend for her to see regarding her vulvar lesion. Advised the pt to follow up with her OB/Gyn on 5/4. Left clinic number for the pt to call with any questions or concerns.

## 2022-10-12 ENCOUNTER — TRANSCRIBE ORDERS (OUTPATIENT)
Dept: OTHER | Age: 77
End: 2022-10-12

## 2022-10-12 ENCOUNTER — TELEPHONE (OUTPATIENT)
Dept: UROLOGY | Facility: CLINIC | Age: 77
End: 2022-10-12

## 2022-10-12 DIAGNOSIS — Z87.440 HISTORY OF RECURRENT UTI (URINARY TRACT INFECTION): Primary | ICD-10-CM

## 2022-10-12 NOTE — TELEPHONE ENCOUNTER
Called and spoke with the patient to inquire if she would like to come in and see Dr Hastings tomorrow instead of waiting to see Dr Bell in December.    Patient stated she would like to do that and is scheduled for tomorrow at 1:15 to see Dr Hastings. I did cancel her appointment for December with Dr Bell.    Patient verbalized understanding.

## 2022-10-12 NOTE — TELEPHONE ENCOUNTER
Health Call Center    Phone Message    May a detailed message be left on voicemail: yes     Reason for Call: Appointment Intake    Referring Provider Name: Dr Ashley Hong   Diagnosis and/or Symptoms: Recurrent UTIs    Pt referred for above. Pt scheduled with [facility] on 12/12/22. Added to wait list. Pt self caths and is having issues with it. Pt states she has a lot of blood at times and is concerned and would like advice on what to do in the mean time before her appt in December. Please review and follow-up with patient    Action Taken: Message routed to:  [facility] ([facility] Urology    Travel Screening: Not Applicable

## 2022-10-13 ENCOUNTER — OFFICE VISIT (OUTPATIENT)
Dept: UROLOGY | Facility: CLINIC | Age: 77
End: 2022-10-13
Attending: OBSTETRICS & GYNECOLOGY
Payer: COMMERCIAL

## 2022-10-13 ENCOUNTER — OFFICE VISIT (OUTPATIENT)
Dept: OPHTHALMOLOGY | Facility: CLINIC | Age: 77
End: 2022-10-13
Attending: OPHTHALMOLOGY
Payer: COMMERCIAL

## 2022-10-13 VITALS — SYSTOLIC BLOOD PRESSURE: 169 MMHG | HEART RATE: 71 BPM | DIASTOLIC BLOOD PRESSURE: 100 MMHG

## 2022-10-13 DIAGNOSIS — H52.03 HYPEROPIA OF BOTH EYES WITH ASTIGMATISM AND PRESBYOPIA: ICD-10-CM

## 2022-10-13 DIAGNOSIS — H40.033 ANATOMICAL NARROW ANGLE BORDERLINE GLAUCOMA OF BOTH EYES: ICD-10-CM

## 2022-10-13 DIAGNOSIS — H53.022 REFRACTIVE AMBLYOPIA OF LEFT EYE: ICD-10-CM

## 2022-10-13 DIAGNOSIS — Z87.440 HISTORY OF RECURRENT UTI (URINARY TRACT INFECTION): ICD-10-CM

## 2022-10-13 DIAGNOSIS — R87.622 LGSIL PAP SMEAR OF VAGINA: Primary | ICD-10-CM

## 2022-10-13 DIAGNOSIS — H52.203 HYPEROPIA OF BOTH EYES WITH ASTIGMATISM AND PRESBYOPIA: ICD-10-CM

## 2022-10-13 DIAGNOSIS — H25.13 NUCLEAR SENILE CATARACT OF BOTH EYES: Primary | ICD-10-CM

## 2022-10-13 DIAGNOSIS — H52.4 HYPEROPIA OF BOTH EYES WITH ASTIGMATISM AND PRESBYOPIA: ICD-10-CM

## 2022-10-13 PROCEDURE — 99204 OFFICE O/P NEW MOD 45 MIN: CPT | Mod: GC | Performed by: OPHTHALMOLOGY

## 2022-10-13 PROCEDURE — G0463 HOSPITAL OUTPT CLINIC VISIT: HCPCS | Mod: 25

## 2022-10-13 PROCEDURE — 99204 OFFICE O/P NEW MOD 45 MIN: CPT | Mod: GC | Performed by: OBSTETRICS & GYNECOLOGY

## 2022-10-13 PROCEDURE — 92132 CPTRZD OPH DX IMG ANT SGM: CPT | Performed by: OPHTHALMOLOGY

## 2022-10-13 PROCEDURE — G0124 SCREEN C/V THIN LAYER BY MD: HCPCS | Performed by: PATHOLOGY

## 2022-10-13 PROCEDURE — 87624 HPV HI-RISK TYP POOLED RSLT: CPT | Performed by: OBSTETRICS & GYNECOLOGY

## 2022-10-13 PROCEDURE — 76519 ECHO EXAM OF EYE: CPT | Performed by: OPHTHALMOLOGY

## 2022-10-13 PROCEDURE — G0463 HOSPITAL OUTPT CLINIC VISIT: HCPCS | Performed by: OBSTETRICS & GYNECOLOGY

## 2022-10-13 PROCEDURE — G0145 SCR C/V CYTO,THINLAYER,RESCR: HCPCS | Performed by: OBSTETRICS & GYNECOLOGY

## 2022-10-13 RX ORDER — SULFAMETHOXAZOLE/TRIMETHOPRIM 800-160 MG
TABLET ORAL
COMMUNITY
Start: 2022-10-07

## 2022-10-13 RX ORDER — CEPHALEXIN 500 MG/1
CAPSULE ORAL
COMMUNITY
Start: 2022-10-10

## 2022-10-13 ASSESSMENT — VISUAL ACUITY
OD_SC+: -1
METHOD: SNELLEN - LINEAR
OD_SC: 20/50
OS_SC: 20/500

## 2022-10-13 ASSESSMENT — TONOMETRY
OS_IOP_MMHG: 17
IOP_METHOD: ICARE
OD_IOP_MMHG: 19

## 2022-10-13 ASSESSMENT — REFRACTION_MANIFEST
OD_AXIS: 175
OD_ADD: +2.50
OS_ADD: +2.50
OS_SPHERE: +4.25
OD_SPHERE: +0.75
OS_AXIS: 170
OD_CYLINDER: +1.00
OS_CYLINDER: +0.75

## 2022-10-13 ASSESSMENT — CONF VISUAL FIELD
OD_SUPERIOR_TEMPORAL_RESTRICTION: 0
OD_INFERIOR_TEMPORAL_RESTRICTION: 0
OS_INFERIOR_TEMPORAL_RESTRICTION: 0
OS_INFERIOR_NASAL_RESTRICTION: 0
OD_NORMAL: 1
OS_SUPERIOR_TEMPORAL_RESTRICTION: 0
OD_SUPERIOR_NASAL_RESTRICTION: 0
METHOD: COUNTING FINGERS
OS_NORMAL: 1
OD_INFERIOR_NASAL_RESTRICTION: 0
OS_SUPERIOR_NASAL_RESTRICTION: 0

## 2022-10-13 ASSESSMENT — ENCOUNTER SYMPTOMS
FEVER: 0
SINUS PAIN: 1
HYPERTENSION: 1
COUGH: 0
SINUS CONGESTION: 1
HEADACHES: 0
NIGHT SWEATS: 0
DIARRHEA: 0
EYE IRRITATION: 0
SYNCOPE: 0
WEIGHT LOSS: 0
CONSTIPATION: 0
HEMATURIA: 1
HOT FLASHES: 0
SHORTNESS OF BREATH: 0
DIFFICULTY URINATING: 1
WEIGHT GAIN: 0
SORE THROAT: 0
ABDOMINAL PAIN: 0
BRUISES/BLEEDS EASILY: 0
BACK PAIN: 1
CHILLS: 0

## 2022-10-13 ASSESSMENT — CUP TO DISC RATIO
OS_RATIO: 0.2
OD_RATIO: 0.2

## 2022-10-13 ASSESSMENT — EXTERNAL EXAM - RIGHT EYE: OD_EXAM: NORMAL

## 2022-10-13 ASSESSMENT — SLIT LAMP EXAM - LIDS
COMMENTS: NORMAL
COMMENTS: NORMAL

## 2022-10-13 ASSESSMENT — EXTERNAL EXAM - LEFT EYE: OS_EXAM: NORMAL
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
## 2022-10-13 NOTE — PROGRESS NOTES
Chief Complaint(s) and History of Present Illness(es)     Cataract Evaluation            Laterality: both eyes    Associated symptoms: blurred vision and a need for brighter lights    Onset: gradual    Treatments tried: no treatments          Comments    Here for cataracts evaluation in both eyes. Vision has been gradually   decreasing over the year making it difficult to read fine prints. Denies   using lubricating drops. No flashes or floaters.  Hx of amblyopia left eye, patched as a kid.    Enoch Braga COT 3:15 PM October 13, 2022                Review of systems for the eyes was negative other than the pertinent positives/negatives listed in the HPI.      Assessment & Plan      Erika Kelly is a 77 year old female with the following diagnoses:   1. Nuclear senile cataract of both eyes    2. Anatomical narrow angle borderline glaucoma of both eyes    3. Hyperopia of both eyes with astigmatism and presbyopia    4. Refractive amblyopia of left eye         Vision deteriorating and more fuzzy in the right eye since May 2022, things are not as sharp.     Medical history: T2 hemiplegia, hypertension (hydrochlorothiazide), rosacea  Family  History: no history of glaucoma     Narrow by VH with handheld slit lamp, both eyes angles appear occludable in nasal and temporal quadrants by Ant seg OCT today.    Defer dilation today (patient was dilated with tropicamide 5/2022 at outside clinic without complications)    Cataracts both eyes   Visually significant both eyes, amblyopic left eye   Risks, benefits and alternatives to cataract extraction/IOL implantation discussed; consent obtained.  Will schedule surgery today     Special equipment/needs: T12 spinal cord injury, has a sliding board for transfer     Anesthesia:Topical + MAC vs topical if unable to have anyone at home with post anesthesia cares  Dilation:TBD  Iris expansion:TBD  Pseudoexfoliation: No pseudoexfoliation on undilated exam with handheld slit  lamp  Anticoagulation/antiplatelet:  Trypan Blue: No  Goal emmetropia right eye only for now  Dex    Schedule Right eye CE IOL, schedule Pentacam v Immersion to confirm axial length. Technician visit 1-2 weeks prior to surgery         Sierra Story MD  Ophthalmology, PGY4    Attending Physician Attestation:  Complete documentation of historical and exam elements from today's encounter can be found in the full encounter summary report (not reduplicated in this progress note).  I personally obtained the chief complaint(s) and history of present illness.  I confirmed and edited as necessary the review of systems, past medical/surgical history, family history, social history, and examination findings as documented by others; and I examined the patient myself.  I personally reviewed the relevant tests, images, and reports as documented above.  I formulated and edited as necessary the assessment and plan and discussed the findings and management plan with the patient and family.  Attending Physician Image/Tesing Attestation: I personally reviewed the ophthalmic test(s) associated with this encounter, agree with the interpretation(s) as documented by the resident/fellow, and have edited the corresponding report(s) as necessary.   Today with Erika Kelly, I reviewed the indications, risks, benefits, and alternatives of the proposed surgical procedure including, but not limited to, failure obtain the desired result  and need for additional surgery, bleeding, infection, loss of vision, loss of the eye, and the remote possibility of permanent damage to any organ system or death with the use of anesthesia.  I provided multiple opportunities for the questions, answered all questions to the best of my ability, and confirmed that my answers and my discussion were understood.      . - Alonso Mcdaniel MD

## 2022-10-13 NOTE — NURSING NOTE
Chief Complaints and History of Present Illnesses   Patient presents with     Cataract Evaluation     Chief Complaint(s) and History of Present Illness(es)     Cataract Evaluation            Laterality: both eyes    Associated symptoms: blurred vision and a need for brighter lights    Onset: gradual    Treatments tried: no treatments          Comments    Here for cataracts evaluation in both eyes. Vision has been gradually decreasing over the year making it difficult to read fine prints. Denies using lubricating drops. No flashes or floaters.  Hx of amblyopia left eye, patched as a kid.    Enoch Braga COT 3:15 PM October 13, 2022
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
## 2022-10-14 PROBLEM — H52.4 HYPEROPIA OF BOTH EYES WITH ASTIGMATISM AND PRESBYOPIA: Status: ACTIVE | Noted: 2022-10-14

## 2022-10-14 PROBLEM — H40.033 ANATOMICAL NARROW ANGLE BORDERLINE GLAUCOMA OF BOTH EYES: Status: ACTIVE | Noted: 2022-10-14

## 2022-10-14 PROBLEM — H52.03 HYPEROPIA OF BOTH EYES WITH ASTIGMATISM AND PRESBYOPIA: Status: ACTIVE | Noted: 2022-10-14

## 2022-10-14 PROBLEM — H52.203 HYPEROPIA OF BOTH EYES WITH ASTIGMATISM AND PRESBYOPIA: Status: ACTIVE | Noted: 2022-10-14

## 2022-10-14 PROBLEM — H25.13 NUCLEAR SENILE CATARACT OF BOTH EYES: Status: ACTIVE | Noted: 2022-10-14

## 2022-10-17 LAB
BKR LAB AP GYN ADEQUACY: ABNORMAL
BKR LAB AP GYN INTERPRETATION: ABNORMAL
BKR LAB AP HPV REFLEX: ABNORMAL
BKR LAB AP PREVIOUS ABNL DX: ABNORMAL
BKR LAB AP PREVIOUS ABNORMAL: ABNORMAL
PATH REPORT.COMMENTS IMP SPEC: ABNORMAL
PATH REPORT.COMMENTS IMP SPEC: ABNORMAL
PATH REPORT.RELEVANT HX SPEC: ABNORMAL

## 2022-10-18 ENCOUNTER — TELEPHONE (OUTPATIENT)
Dept: OPHTHALMOLOGY | Facility: CLINIC | Age: 77
End: 2022-10-18

## 2022-10-18 NOTE — TELEPHONE ENCOUNTER
Spoke briefly with patient, she was on her way to a  Appt and wished to be called tomorrow  Let patient know that I am leaving on vacation and I would have my coworker reach out to her.    Anna C. Schoenecker on 10/18/2022 at 1:04 PM

## 2022-10-19 LAB
HUMAN PAPILLOMA VIRUS 16 DNA: NEGATIVE
HUMAN PAPILLOMA VIRUS 18 DNA: NEGATIVE
HUMAN PAPILLOMA VIRUS FINAL DIAGNOSIS: NORMAL
HUMAN PAPILLOMA VIRUS OTHER HR: NEGATIVE

## 2022-10-28 ENCOUNTER — TELEPHONE (OUTPATIENT)
Dept: UROLOGY | Facility: CLINIC | Age: 77
End: 2022-10-28

## 2022-11-01 ENCOUNTER — TELEPHONE (OUTPATIENT)
Dept: OPHTHALMOLOGY | Facility: CLINIC | Age: 77
End: 2022-11-01

## 2022-11-01 NOTE — TELEPHONE ENCOUNTER
Called patient to schedule surgery with Dr. Mcdaniel    Date of Surgery: 1/16,2/6    Location of surgery: [facility]    Pre-Op H&P: PCP    Pre/Post Imaging:  No    Discussed COVID-19 Testing: Yes    Post-Op Appt Date: 2/28    Surgery Packet Mailed: Yes      Additional comments: Spoke with patient, they are aware of above dates, will review packet and call with any questions           Anna C. Schoenecker on 11/1/2022 at 10:58 AM

## 2022-12-13 ENCOUNTER — OFFICE VISIT (OUTPATIENT)
Dept: OBGYN | Facility: CLINIC | Age: 77
End: 2022-12-13
Attending: OBSTETRICS & GYNECOLOGY
Payer: COMMERCIAL

## 2022-12-13 VITALS
SYSTOLIC BLOOD PRESSURE: 139 MMHG | BODY MASS INDEX: 24.69 KG/M2 | HEIGHT: 62 IN | DIASTOLIC BLOOD PRESSURE: 79 MMHG | HEART RATE: 75 BPM

## 2022-12-13 DIAGNOSIS — R87.622 PAPANICOLAOU SMEAR OF VAGINA WITH LOW GRADE SQUAMOUS INTRAEPITHELIAL LESION (LGSIL): Primary | ICD-10-CM

## 2022-12-13 PROCEDURE — 57420 EXAM OF VAGINA W/SCOPE: CPT | Performed by: OBSTETRICS & GYNECOLOGY

## 2022-12-13 NOTE — PROGRESS NOTES
"  Colposcopy Visit/Procedure Note:    Erika Kelly is an 77 year old, who comes in for diagnosis of abnormal pap screen of vaginal cuff. Pap initially completed as part of evaluation of bleeding. Has significant vulvar atrophy and bleeding. Self catheterizes due to neurogenic bladder. Has previously had multiple vulvar biopsies due to ulcerative lesions. Has been prescribed estrogen cream several times, but has only used for short courses. Is unclear about duration of use recommended. Did have mild burning with use.     Menstrual History: Post hysterectomy      Lab Results   Component Value Date    PAP LSIL 2020    PAP LSIL 2011       Last   Lab Results   Component Value Date    HPV16 Negative 10/13/2022    HPV16 Negative 2020     Last   Lab Results   Component Value Date    HPV18 Negative 10/13/2022    HPV18 Negative 2020     Last   Lab Results   Component Value Date    HRHPV Negative 10/13/2022    HRHPV Negative 2020       Dates/results of previous cervical pathology: Hysterectomy in 30s for cervical dysplasia      History   Smoking Status     Never   Smokeless Tobacco     Never       Allergies as of 2022 - Reviewed 2022   Allergen Reaction Noted     Ciprofloxacin Muscle Pain (Myalgia) 2019        Colposcopy Procedure:    Consent:  Details of the colposcopic procedure were reviewed. Risks, benefits of treatment, and alternate forms of evaluation were discussed.  Patient's questions were elicited and answered.   Written consent was obtained and scanned into medical record.     Verification of Procedure  Just before the procedure begins, through verbal and active participation of team members, I verified:   Initials   Patient Name [facility]   Patient  [facility]   Procedure to be performed [facility]       OBJECTIVE: /79   Pulse 75   Ht 1.575 m (5' 2\")   BMI 24.69 kg/m      Pelvic Exam:  EG/BUS: Estrogen loss atrophy present. Regression of labia with agglutination of " labia. Bleeding upon separation of labia fo rexam.   Vagina: atrophic, thin, dry with no gross lesions of vaginal cuff    PROCEDURE:      Acetic acid and/or Lugol's solution applied to vaginal cuff.  Colposcopic exam of the vagina and apex of the vagina was conducted in the usual fashion.     Findings:  No lesions or acetowhite changes were noted.     There were no concerning findings    Assessment: Genitourinary syndrome of menopause.     Plan:   Reviewed exam findings today with bleeding from labia. Reassurance given that vaginal exam is normal. In setting of LSIL pap with negative HPV and hysterectomy 40 years ago, joint decision making was used to decide on discontinuing pap smear evaluations.     Discussed bleeding from labia and concern for worsening atrophy. Notes she did have improvement when used estrogen. Reviewed restarting estrogen and continuing indefinitely. Offered trial of tablet if she was more comfortable, but prefers to stick with cream. Recommend repeat evaluation of vulva in 6 months or sooner if ongoing issues.     Verbalized understanding and agreement with plan.    Jenny Arita MD

## 2022-12-13 NOTE — LETTER
12/13/2022       RE: Erika Kelly  [address]  [place] [address]     Dear Colleague,    Thank you for referring your patient, Erika Kelly, to the [facility] [place] at [facility]. Please see a copy of my visit note below.      Colposcopy Visit/Procedure Note:    Erika Kelly is an 77 year old, who comes in for diagnosis of abnormal pap screen of vaginal cuff. Pap initially completed as part of evaluation of bleeding. Has significant vulvar atrophy and bleeding. Self catheterizes due to neurogenic bladder. Has previously had multiple vulvar biopsies due to ulcerative lesions. Has been prescribed estrogen cream several times, but has only used for short courses. Is unclear about duration of use recommended. Did have mild burning with use.     Menstrual History: Post hysterectomy      Lab Results   Component Value Date    PAP LSIL 01/06/2020    PAP LSIL 07/06/2011       Last   Lab Results   Component Value Date    HPV16 Negative 10/13/2022    HPV16 Negative 01/06/2020     Last   Lab Results   Component Value Date    HPV18 Negative 10/13/2022    HPV18 Negative 01/06/2020     Last   Lab Results   Component Value Date    HRHPV Negative 10/13/2022    HRHPV Negative 01/06/2020       Dates/results of previous cervical pathology: Hysterectomy in 30s for cervical dysplasia      History   Smoking Status     Never   Smokeless Tobacco     Never       Allergies as of 12/13/2022 - Reviewed 12/13/2022   Allergen Reaction Noted     Ciprofloxacin Muscle Pain (Myalgia) 06/24/2019        Colposcopy Procedure:    Consent:  Details of the colposcopic procedure were reviewed. Risks, benefits of treatment, and alternate forms of evaluation were discussed.  Patient's questions were elicited and answered.   Written consent was obtained and scanned into medical record.     Verification of Procedure  Just before the procedure begins, through verbal  "and active participation of team members, I verified:   Initials   Patient Name [facility]   Patient  [facility]   Procedure to be performed [facility]       OBJECTIVE: /79   Pulse 75   Ht 1.575 m (5' 2\")   BMI 24.69 kg/m      Pelvic Exam:  EG/BUS: Estrogen loss atrophy present. Regression of labia with agglutination of labia. Bleeding upon separation of labia fo rexam.   Vagina: atrophic, thin, dry with no gross lesions of vaginal cuff    PROCEDURE:      Acetic acid and/or Lugol's solution applied to vaginal cuff.  Colposcopic exam of the vagina and apex of the vagina was conducted in the usual fashion.     Findings:  No lesions or acetowhite changes were noted.     There were no concerning findings    Assessment: Genitourinary syndrome of menopause.     Plan:   Reviewed exam findings today with bleeding from labia. Reassurance given that vaginal exam is normal. In setting of LSIL pap with negative HPV and hysterectomy 40 years ago, joint decision making was used to decide on discontinuing pap smear evaluations.     Discussed bleeding from labia and concern for worsening atrophy. Notes she did have improvement when used estrogen. Reviewed restarting estrogen and continuing indefinitely. Offered trial of tablet if she was more comfortable, but prefers to stick with cream. Recommend repeat evaluation of vulva in 6 months or sooner if ongoing issues.     Verbalized understanding and agreement with plan.    Jenny Arita MD      "

## 2023-01-13 ENCOUNTER — ANESTHESIA EVENT (OUTPATIENT)
Dept: SURGERY | Facility: AMBULATORY SURGERY CENTER | Age: 78
End: 2023-01-13
Payer: COMMERCIAL

## 2023-01-14 NOTE — PROGRESS NOTES
POD#0, status post cataract surgery, right eye     Impression/Plan:  Pseudophakia, right eye: POD0, good post-operative appearance. IOP reasonable.     Eye protection at all times and eye shield at night for 1 week.     Limited activities with no exercise or heavy lifting for 1 week.     Instructed patient to contact us for decreasing vision, eye pain, new floaters or flashes of light or other concerning symptoms.     Written instructions given    Drops:  Moxifloxacin TID for 7 days in operative eye  Prednisolone 4/3/2/1 taper every 7 days in operative eye    Follow up scheduled for CEIOL left eye on 2/6/23    All questions were answered    Idania Carrera MD  Ophthalmology Resident, PGY-4  [facility]     Not seen by staff during this visit, available should need have arisen.  Plan appropriate as above.    Alonso Mcdaniel MD  , Comprehensive Ophthalmology  Department of Ophthalmology and Visual Neurosciences  [facility]

## 2023-01-16 ENCOUNTER — OFFICE VISIT (OUTPATIENT)
Dept: OPHTHALMOLOGY | Facility: CLINIC | Age: 78
End: 2023-01-16

## 2023-01-16 ENCOUNTER — HOSPITAL ENCOUNTER (OUTPATIENT)
Facility: AMBULATORY SURGERY CENTER | Age: 78
Discharge: HOME | End: 2023-01-16
Attending: OPHTHALMOLOGY
Payer: COMMERCIAL

## 2023-01-16 ENCOUNTER — ANESTHESIA (OUTPATIENT)
Dept: SURGERY | Facility: AMBULATORY SURGERY CENTER | Age: 78
End: 2023-01-16
Payer: COMMERCIAL

## 2023-01-16 VITALS
OXYGEN SATURATION: 100 % | DIASTOLIC BLOOD PRESSURE: 73 MMHG | WEIGHT: 138 LBS | BODY MASS INDEX: 25.4 KG/M2 | TEMPERATURE: 97.5 F | SYSTOLIC BLOOD PRESSURE: 120 MMHG | HEIGHT: 62 IN | RESPIRATION RATE: 14 BRPM | HEART RATE: 72 BPM

## 2023-01-16 DIAGNOSIS — Z98.890 POSTOPERATIVE EYE STATE: ICD-10-CM

## 2023-01-16 DIAGNOSIS — Z96.1 PSEUDOPHAKIA, RIGHT EYE: Primary | ICD-10-CM

## 2023-01-16 DIAGNOSIS — H52.03 HYPEROPIA OF BOTH EYES WITH ASTIGMATISM AND PRESBYOPIA: ICD-10-CM

## 2023-01-16 DIAGNOSIS — H52.203 HYPEROPIA OF BOTH EYES WITH ASTIGMATISM AND PRESBYOPIA: ICD-10-CM

## 2023-01-16 DIAGNOSIS — H40.033 ANATOMICAL NARROW ANGLE BORDERLINE GLAUCOMA OF BOTH EYES: ICD-10-CM

## 2023-01-16 DIAGNOSIS — H52.4 HYPEROPIA OF BOTH EYES WITH ASTIGMATISM AND PRESBYOPIA: ICD-10-CM

## 2023-01-16 DIAGNOSIS — H25.13 NUCLEAR SENILE CATARACT OF BOTH EYES: ICD-10-CM

## 2023-01-16 PROCEDURE — 99207 PR SERVICE NOT STAFFED W/SUPERV PROV: CPT | Mod: GC | Performed by: OPHTHALMOLOGY

## 2023-01-16 PROCEDURE — 66984 XCAPSL CTRC RMVL W/O ECP: CPT | Mod: RT

## 2023-01-16 PROCEDURE — 66984 XCAPSL CTRC RMVL W/O ECP: CPT | Mod: RT | Performed by: OPHTHALMOLOGY

## 2023-01-16 DEVICE — LENS CC60WF 22.5 CLAREON UV ASPHERIC BICONVEX IOL: Type: IMPLANTABLE DEVICE | Site: EYE | Status: FUNCTIONAL

## 2023-01-16 RX ORDER — MOXIFLOXACIN IN NACL,ISO-OS/PF 0.3MG/0.3
SYRINGE (ML) INTRAOCULAR PRN
Status: DISCONTINUED | OUTPATIENT
Start: 2023-01-16 | End: 2023-01-16 | Stop reason: HOSPADM

## 2023-01-16 RX ORDER — OXYCODONE HYDROCHLORIDE 5 MG/1
10 TABLET ORAL EVERY 4 HOURS PRN
Status: DISCONTINUED | OUTPATIENT
Start: 2023-01-16 | End: 2023-01-17 | Stop reason: HOSPADM

## 2023-01-16 RX ORDER — ONDANSETRON 2 MG/ML
4 INJECTION INTRAMUSCULAR; INTRAVENOUS EVERY 30 MIN PRN
Status: DISCONTINUED | OUTPATIENT
Start: 2023-01-16 | End: 2023-01-17 | Stop reason: HOSPADM

## 2023-01-16 RX ORDER — CYCLOPENTOLAT/TROPIC/PHENYLEPH 1%-1%-2.5%
1 DROPS (EA) OPHTHALMIC (EYE)
Status: COMPLETED | OUTPATIENT
Start: 2023-01-16 | End: 2023-01-16

## 2023-01-16 RX ORDER — LIDOCAINE HYDROCHLORIDE 10 MG/ML
INJECTION, SOLUTION EPIDURAL; INFILTRATION; INTRACAUDAL; PERINEURAL PRN
Status: DISCONTINUED | OUTPATIENT
Start: 2023-01-16 | End: 2023-01-16 | Stop reason: HOSPADM

## 2023-01-16 RX ORDER — SODIUM CHLORIDE, SODIUM LACTATE, POTASSIUM CHLORIDE, CALCIUM CHLORIDE 600; 310; 30; 20 MG/100ML; MG/100ML; MG/100ML; MG/100ML
INJECTION, SOLUTION INTRAVENOUS CONTINUOUS
Status: DISCONTINUED | OUTPATIENT
Start: 2023-01-16 | End: 2023-01-16 | Stop reason: HOSPADM

## 2023-01-16 RX ORDER — FENTANYL CITRATE 50 UG/ML
50 INJECTION, SOLUTION INTRAMUSCULAR; INTRAVENOUS EVERY 5 MIN PRN
Status: DISCONTINUED | OUTPATIENT
Start: 2023-01-16 | End: 2023-01-16 | Stop reason: HOSPADM

## 2023-01-16 RX ORDER — PREDNISOLONE ACETATE 10 MG/ML
1 SUSPENSION/ DROPS OPHTHALMIC 4 TIMES DAILY
Qty: 10 ML | Refills: 1 | Status: SHIPPED | OUTPATIENT
Start: 2023-01-16

## 2023-01-16 RX ORDER — PROPARACAINE HYDROCHLORIDE 5 MG/ML
1 SOLUTION/ DROPS OPHTHALMIC ONCE
Status: COMPLETED | OUTPATIENT
Start: 2023-01-16 | End: 2023-01-16

## 2023-01-16 RX ORDER — SODIUM CHLORIDE, SODIUM LACTATE, POTASSIUM CHLORIDE, CALCIUM CHLORIDE 600; 310; 30; 20 MG/100ML; MG/100ML; MG/100ML; MG/100ML
INJECTION, SOLUTION INTRAVENOUS CONTINUOUS
Status: DISCONTINUED | OUTPATIENT
Start: 2023-01-16 | End: 2023-01-17 | Stop reason: HOSPADM

## 2023-01-16 RX ORDER — HYDROMORPHONE HYDROCHLORIDE 1 MG/ML
0.2 INJECTION, SOLUTION INTRAMUSCULAR; INTRAVENOUS; SUBCUTANEOUS EVERY 5 MIN PRN
Status: DISCONTINUED | OUTPATIENT
Start: 2023-01-16 | End: 2023-01-16 | Stop reason: HOSPADM

## 2023-01-16 RX ORDER — LIDOCAINE 40 MG/G
CREAM TOPICAL
Status: DISCONTINUED | OUTPATIENT
Start: 2023-01-16 | End: 2023-01-16 | Stop reason: HOSPADM

## 2023-01-16 RX ORDER — BALANCED SALT SOLUTION 6.4; .75; .48; .3; 3.9; 1.7 MG/ML; MG/ML; MG/ML; MG/ML; MG/ML; MG/ML
SOLUTION OPHTHALMIC PRN
Status: DISCONTINUED | OUTPATIENT
Start: 2023-01-16 | End: 2023-01-16 | Stop reason: HOSPADM

## 2023-01-16 RX ORDER — FENTANYL CITRATE 50 UG/ML
25 INJECTION, SOLUTION INTRAMUSCULAR; INTRAVENOUS EVERY 5 MIN PRN
Status: DISCONTINUED | OUTPATIENT
Start: 2023-01-16 | End: 2023-01-16 | Stop reason: HOSPADM

## 2023-01-16 RX ORDER — ACETAMINOPHEN 325 MG/1
975 TABLET ORAL ONCE
Status: COMPLETED | OUTPATIENT
Start: 2023-01-16 | End: 2023-01-16

## 2023-01-16 RX ORDER — MEPERIDINE HYDROCHLORIDE 25 MG/ML
12.5 INJECTION INTRAMUSCULAR; INTRAVENOUS; SUBCUTANEOUS
Status: DISCONTINUED | OUTPATIENT
Start: 2023-01-16 | End: 2023-01-17 | Stop reason: HOSPADM

## 2023-01-16 RX ORDER — FENTANYL CITRATE 50 UG/ML
25 INJECTION, SOLUTION INTRAMUSCULAR; INTRAVENOUS
Status: DISCONTINUED | OUTPATIENT
Start: 2023-01-16 | End: 2023-01-17 | Stop reason: HOSPADM

## 2023-01-16 RX ORDER — OXYCODONE HYDROCHLORIDE 5 MG/1
5 TABLET ORAL EVERY 4 HOURS PRN
Status: DISCONTINUED | OUTPATIENT
Start: 2023-01-16 | End: 2023-01-17 | Stop reason: HOSPADM

## 2023-01-16 RX ORDER — HYDROMORPHONE HYDROCHLORIDE 1 MG/ML
0.4 INJECTION, SOLUTION INTRAMUSCULAR; INTRAVENOUS; SUBCUTANEOUS EVERY 5 MIN PRN
Status: DISCONTINUED | OUTPATIENT
Start: 2023-01-16 | End: 2023-01-16 | Stop reason: HOSPADM

## 2023-01-16 RX ORDER — MOXIFLOXACIN 5 MG/ML
1 SOLUTION/ DROPS OPHTHALMIC 3 TIMES DAILY
Qty: 3 ML | Refills: 1 | Status: SHIPPED | OUTPATIENT
Start: 2023-01-16

## 2023-01-16 RX ORDER — FENTANYL CITRATE 50 UG/ML
INJECTION, SOLUTION INTRAMUSCULAR; INTRAVENOUS PRN
Status: DISCONTINUED | OUTPATIENT
Start: 2023-01-16 | End: 2023-01-16

## 2023-01-16 RX ORDER — DEXAMETHASONE SODIUM PHOSPHATE 4 MG/ML
INJECTION, SOLUTION INTRA-ARTICULAR; INTRALESIONAL; INTRAMUSCULAR; INTRAVENOUS; SOFT TISSUE PRN
Status: DISCONTINUED | OUTPATIENT
Start: 2023-01-16 | End: 2023-01-16 | Stop reason: HOSPADM

## 2023-01-16 RX ORDER — ONDANSETRON 4 MG/1
4 TABLET, ORALLY DISINTEGRATING ORAL EVERY 30 MIN PRN
Status: DISCONTINUED | OUTPATIENT
Start: 2023-01-16 | End: 2023-01-17 | Stop reason: HOSPADM

## 2023-01-16 RX ORDER — TETRACAINE HYDROCHLORIDE 5 MG/ML
SOLUTION OPHTHALMIC PRN
Status: DISCONTINUED | OUTPATIENT
Start: 2023-01-16 | End: 2023-01-16 | Stop reason: HOSPADM

## 2023-01-16 RX ORDER — TIMOLOL MALEATE 5 MG/ML
SOLUTION/ DROPS OPHTHALMIC PRN
Status: DISCONTINUED | OUTPATIENT
Start: 2023-01-16 | End: 2023-01-16 | Stop reason: HOSPADM

## 2023-01-16 RX ADMIN — SODIUM CHLORIDE, SODIUM LACTATE, POTASSIUM CHLORIDE, CALCIUM CHLORIDE: 600; 310; 30; 20 INJECTION, SOLUTION INTRAVENOUS at 09:51

## 2023-01-16 RX ADMIN — Medication 1 DROP: at 09:53

## 2023-01-16 RX ADMIN — Medication 1 DROP: at 09:39

## 2023-01-16 RX ADMIN — Medication 1 DROP: at 09:48

## 2023-01-16 RX ADMIN — ACETAMINOPHEN 975 MG: 325 TABLET ORAL at 09:37

## 2023-01-16 RX ADMIN — PROPARACAINE HYDROCHLORIDE 1 DROP: 5 SOLUTION/ DROPS OPHTHALMIC at 09:37

## 2023-01-16 RX ADMIN — FENTANYL CITRATE 25 MCG: 50 INJECTION, SOLUTION INTRAMUSCULAR; INTRAVENOUS at 10:01

## 2023-01-16 ASSESSMENT — VISUAL ACUITY
OD_SC: 20/20
METHOD: SNELLEN - LINEAR
OD_SC+: -2

## 2023-01-16 ASSESSMENT — TONOMETRY
IOP_METHOD: TONOPEN
OD_IOP_MMHG: 15

## 2023-01-16 ASSESSMENT — SLIT LAMP EXAM - LIDS: COMMENTS: NORMAL

## 2023-01-16 NOTE — ANESTHESIA POSTPROCEDURE EVALUATION
Patient: Erika Kelly    Procedure: Procedure(s):  RIGHT EYE PHACOEMULSIFICATION, CATARACT, WITH STANDARD INTRAOCULAR LENS IMPLANT INSERTION       Anesthesia Type:  MAC    Note:  Disposition: Outpatient   Postop Pain Control: Uneventful            Sign Out: Well controlled pain   PONV: No   Neuro/Psych: Uneventful            Sign Out: Acceptable/Baseline neuro status   Airway/Respiratory: Uneventful            Sign Out: Acceptable/Baseline resp. status   CV/Hemodynamics: Uneventful            Sign Out: Acceptable CV status   Other NRE: NONE   DID A NON-ROUTINE EVENT OCCUR? No           Last vitals:  Vitals Value Taken Time   /73 01/16/23 1040   Temp 36.4  C (97.5  F) 01/16/23 1040   Pulse 72 01/16/23 1040   Resp 14 01/16/23 1040   SpO2 100 % 01/16/23 1040       Electronically Signed By: Idris Nunez MD  January 16, 2023  11:14 AM

## 2023-01-16 NOTE — INTERVAL H&P NOTE
"I have reviewed the surgical (or preoperative) H&P that is linked to this encounter, and examined the patient. There are no significant changes    Clinical Conditions Present on Arrival:  Clinically Significant Risk Factors Present on Admission                    # Overweight: Estimated body mass index is 25.24 kg/m  as calculated from the following:    Height as of this encounter: 1.575 m (5' 2\").    Weight as of this encounter: 62.6 kg (138 lb).       "

## 2023-01-16 NOTE — DISCHARGE INSTRUCTIONS
"[facility]  Home Care Following Anesthesia  For 24 hours after surgery:  Get plenty of rest.  A responsible adult must stay with you for at least 24 hours after you leave the surgery center.  Do not drive or use heavy equipment.  If you have weakness or tingling, don't drive or use heavy equipment until this feeling goes away.   Do not drink alcohol.   Avoid strenuous or risky activities.  Ask for help when climbing stairs.  You may feel lightheaded.  IF so, sit for a few minutes before standing.  Have someone help you get up.   If you have nausea (feel sick to your stomach): Drink only clear liquids such as apple juice, ginger ale, broth or 7-Up.  Rest may also help.  Be sure to drink enough fluids.  Move to a regular diet as you feel able.   You may have a slight fever.  Call the doctor if your fever is over 100 F (37.7 C) (taken under the tongue) or lasts longer than 24 hours.  You may have a dry mouth, a sore throat, muscle aches or trouble sleeping. These should go away after 24 hours.  Do not make important or legal decisions.   It is recommended to avoid smoking.        Today you received a Marcaine or bupivacaine block to numb the nerves near your surgery site.  This is a block using local anesthetic or \"numbing\" medication injected around the nerves to anesthetize or \"numb\" the area supplied by those nerves.  This block is injected into the muscle layer near your surgical site.  The medication may numb the location where you had surgery for 6-18 hours, but may last up to 24 hours.  If your surgical site is an arm or leg you should be careful with your affected limb, since it is possible to injure your limb without being aware of it due to the numbing.  Until full feeling returns, you should guard against bumping or hitting your limb, and avoid extreme hot or cold temperatures on the skin.  As the block wears off, the feeling will return as a tingling or prickly " sensation near your surgical site.  You will experience more discomfort from your incision as the feeling returns.  You may want to take a pain pill (a narcotic or Tylenol if this was prescribed by your surgeon) when you start to experience mild pain before the pain beccomes more severe.  If your pain medications do not control your pain you should notifiy your surgeon.    Tips for taking pain medications  To get the best pain relief possible, remember these points:  Take pain medications as directed, before pain becomes severe.  Pain medication can upset your stomach: taking it with food may help.  Constipation is a common side effect of pain medication. Drink plenty of  fluids.  Eat foods high in fiber. Take a stool softener if recommended by your doctor or pharmacist.  Do not drink alcohol, drive or operate machinery while taking pain medications.  Ask about other ways to control pain, such as with heat, ice or relaxation.    Tylenol/Acetaminophen Consumption  To help encourage the safe use of acetaminophen, the makers of TYLENOL  have lowered the maximum daily dose for single-ingredient Extra Strength TYLENOL  (acetaminophen) products sold in the [place] from 8 pills per day (4,000 mg) to 6 pills per day (3,000 mg). The dosing interval has also changed from 2 pills every 4-6 hours to 2 pills every 6 hours.  If you feel your pain relief is insufficient, you may take Tylenol/Acetaminophen in addition to your narcotic pain medication.   Be careful not to exceed 3,000 mg of Tylenol/Acetaminophen in a 24 hour period from all sources.  If you are taking extra strength Tylenol/acetaminophen (500 mg), the maximum dose is 6 tablets in 24 hours.  If you are taking regular strength acetaminophen (325 mg), the maximum dose is 9 tablets in 24 hours.    Call a doctor for any of the following:  Signs of infection (fever, growing tenderness at the surgery site, a large amount of drainage or bleeding, severe pain, foul-smelling  drainage, redness, swelling).  It has been over 8 to 10 hours since surgery and you are still not able to urinate (pass water).  Headache for over 24 hours.  Numbness, tingling or weakness the day after surgery (if you had spinal anesthesia).  Signs of Covid-19 infection (temperature over 100 degrees, shortness of breath, cough, loss of taste/smell, generalized body aches, persistent headache, chills, sore throat, nausea/vomiting/diarrhea)  Your doctor is:       Dr. Alonso Mcdaniel, Ophthalmology: [phone]               Or dial [phone] and ask for the resident on call for:  Ophthalmology  For emergency care, call the:  [facility]:  [phone] (TTY for hearing impaired: [phone])

## 2023-01-16 NOTE — ANESTHESIA CARE TRANSFER NOTE
Patient: Erika Kelly    Procedure: Procedure(s):  RIGHT EYE PHACOEMULSIFICATION, CATARACT, WITH STANDARD INTRAOCULAR LENS IMPLANT INSERTION       Diagnosis: Hyperopia of both eyes with astigmatism and presbyopia [H52.03, H52.203, H52.4]  Nuclear senile cataract of both eyes [H25.13]  Anatomical narrow angle borderline glaucoma of both eyes [H40.033]  Diagnosis Additional Information: No value filed.    Anesthesia Type:   MAC     Note:    Oropharynx: oropharynx clear of all foreign objects and spontaneously breathing  Level of Consciousness: awake  Oxygen Supplementation: room air    Independent Airway: airway patency satisfactory and stable  Dentition: dentition unchanged  Vital Signs Stable: post-procedure vital signs reviewed and stable  Report to RN Given: handoff report given  Patient transferred to: Phase II    Handoff Report: Identifed the Patient, Identified the Reponsible Provider, Reviewed the pertinent medical history, Discussed the surgical course, Reviewed Intra-OP anesthesia mangement and issues during anesthesia, Set expectations for post-procedure period and Allowed opportunity for questions and acknowledgement of understanding      Vitals:  Vitals Value Taken Time   /59 01/16/23 1024   Temp 36.2  C (97.2  F) 01/16/23 1024   Pulse 63 01/16/23 1024   Resp 16 01/16/23 1024   SpO2 98 % 01/16/23 1024       Electronically Signed By: PATTIE Lima CRNA  January 16, 2023  10:25 AM
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
## 2023-01-16 NOTE — OP NOTE
PREOPERATIVE DIAGNOSIS:       1. Nuclear senile cataract of both eyes    2. Anatomical narrow angle borderline glaucoma of both eyes      POSTOPERATIVE DIAGNOSIS: Same   PROCEDURES:   1. Cataract extraction with intraocular lens implant Right eye.  SURGEON: Alonso Mcdaniel M.D.  Assistant: Idania Carrera MD   INDICATIONS: The patient Erika Kelly presented to the eye clinic with decreased vision secondary to cataract in the Right eye. The risks, benefits and alternatives to cataract extraction were discussed. The patient elected to proceed. All questions were answered to the patient's satisfaction.   DESCRIPTION OF PROCEDURE:   Prior to the procedure, appropriate cardiac and respiratory monitors were applied to the patient.  In the pre-operative holding area, a drop of topical tetracaine followed by lidocaine gel followed by povidone iodine.  The patient was brought to the operating room where a surgical pause was carried out to identify with all members of the surgical team the correct surgical site.  With adequate anesthesia, the Right eye was prepped and draped in the usual sterile fashion. A lid speculum was placed, and the operating microscope was rotated into position. A paracentesis was created.  Through this limbal paracentesis, the anterior chamber was filled with preservative-free lidocaine followed by viscoelastic.  A temporal wound was created at the limbus using a 2.6 mm blade. A capsulorrhexis was initiated using a bent 25-gauge needle and was completed in continuous and circular fashion using the capsulorrhexis forceps. The lens nucleus was hydrodissected using balanced salt solution.  The lens nucleus was rotated and removed using phacoemulsification in a stop and chop technique.  Residual cortical material was removed using irrigation-aspiration.  The capsular bag was reinflated to its maximal extent with cohesive viscoelastic.  A 22.5 diopter [id number] inserted into the capsular bag.  The lens  power selected was reviewed using the intraocular lens power measurements that were obtained preoperatively to confirm that the correct lens was selected for the desired post-operative refractive state. The residual viscoelastic was removed in its entirety, the wound were hydrated and found to be self-sealing.  Intracameral moxifloxacin was administered. Tactile pressure was confirmed to be in a normal range.  Subconjunctival Dexamethasone (2 mg) was injected.. The lid speculum was removed and a patch and shield were applied.  The patient tolerated the procedure well, and there were no complications.    PLAN: The patient will be discharged to home and will follow up tomorrow morning in the eye clinic.  EBL:  1 mL   Complications:  None  Implant Name Type Inv. Item Serial No.  Lot No. LRB No. Used Action   LENS [id number] [employer] BICON[employer]EX IOL - [id number] Lens/Eye Implant LENS [id number] [employer]HER[employer] B[employer]ONVEX IOL [id number] [employer]  Right 1 Implanted        Attending Physician Procedure Attestation: I was present for the entire procedure       Alonso Mcdaniel MD  , Comprehensive Ophthalmology  Department of Ophthalmology and Visual Neurosciences  [facility]

## 2023-01-23 ENCOUNTER — TELEPHONE (OUTPATIENT)
Dept: OPHTHALMOLOGY | Facility: CLINIC | Age: 78
End: 2023-01-23
Payer: COMMERCIAL

## 2023-01-24 NOTE — TELEPHONE ENCOUNTER
Patient reached out with some concerns over red eyes that she has, is wondering how long it will take for the redness to go down. she also stated she has called the following number multiple times with no call back [phone].  She would like a call back from the RN asap Anna C. Schoenecker on 1/23/2023 at 2:02 PM    
Spoke to pt at [phone] 1-    Pt reporting blood spot on white part of eye without any pain or vision changes following eye surgery.    Reviewed blood on white part of eye may take 1-2 weeks to absorb, may look worse before better (spread out) and is NOT hurtful to eye.    Reviewed should not have any pain or vision changes, if so to contact clinic    Pt verbally demonstrated understanding and seemed comfortable with information.    Giorgio Walsh RN 8:05 AM 01/24/23          
No

## 2023-02-03 ENCOUNTER — ANESTHESIA EVENT (OUTPATIENT)
Dept: SURGERY | Facility: AMBULATORY SURGERY CENTER | Age: 78
End: 2023-02-03
Payer: COMMERCIAL

## 2023-02-06 ENCOUNTER — OFFICE VISIT (OUTPATIENT)
Dept: OPHTHALMOLOGY | Facility: CLINIC | Age: 78
End: 2023-02-06

## 2023-02-06 ENCOUNTER — HOSPITAL ENCOUNTER (OUTPATIENT)
Facility: AMBULATORY SURGERY CENTER | Age: 78
Discharge: HOME | End: 2023-02-06
Attending: OPHTHALMOLOGY
Payer: COMMERCIAL

## 2023-02-06 ENCOUNTER — ANESTHESIA (OUTPATIENT)
Dept: SURGERY | Facility: AMBULATORY SURGERY CENTER | Age: 78
End: 2023-02-06
Payer: COMMERCIAL

## 2023-02-06 VITALS
SYSTOLIC BLOOD PRESSURE: 101 MMHG | RESPIRATION RATE: 14 BRPM | OXYGEN SATURATION: 100 % | WEIGHT: 138 LBS | TEMPERATURE: 97.6 F | BODY MASS INDEX: 25.4 KG/M2 | DIASTOLIC BLOOD PRESSURE: 59 MMHG | HEART RATE: 72 BPM | HEIGHT: 62 IN

## 2023-02-06 DIAGNOSIS — Z96.1 PSEUDOPHAKIA, LEFT EYE: Primary | ICD-10-CM

## 2023-02-06 DIAGNOSIS — Z98.890 POSTOPERATIVE EYE STATE: ICD-10-CM

## 2023-02-06 DIAGNOSIS — H25.13 NUCLEAR SENILE CATARACT OF BOTH EYES: ICD-10-CM

## 2023-02-06 DIAGNOSIS — H40.033 ANATOMICAL NARROW ANGLE BORDERLINE GLAUCOMA OF BOTH EYES: Primary | ICD-10-CM

## 2023-02-06 PROCEDURE — 66984 XCAPSL CTRC RMVL W/O ECP: CPT | Mod: LT

## 2023-02-06 PROCEDURE — 66984 XCAPSL CTRC RMVL W/O ECP: CPT | Mod: 79 | Performed by: OPHTHALMOLOGY

## 2023-02-06 PROCEDURE — 99024 POSTOP FOLLOW-UP VISIT: CPT | Mod: GC | Performed by: OPHTHALMOLOGY

## 2023-02-06 DEVICE — IMPLANTABLE DEVICE: Type: IMPLANTABLE DEVICE | Site: EYE | Status: FUNCTIONAL

## 2023-02-06 RX ORDER — SODIUM CHLORIDE, SODIUM LACTATE, POTASSIUM CHLORIDE, CALCIUM CHLORIDE 600; 310; 30; 20 MG/100ML; MG/100ML; MG/100ML; MG/100ML
INJECTION, SOLUTION INTRAVENOUS CONTINUOUS
Status: DISCONTINUED | OUTPATIENT
Start: 2023-02-06 | End: 2023-02-07 | Stop reason: HOSPADM

## 2023-02-06 RX ORDER — BALANCED SALT SOLUTION 6.4; .75; .48; .3; 3.9; 1.7 MG/ML; MG/ML; MG/ML; MG/ML; MG/ML; MG/ML
SOLUTION OPHTHALMIC PRN
Status: DISCONTINUED | OUTPATIENT
Start: 2023-02-06 | End: 2023-02-06 | Stop reason: HOSPADM

## 2023-02-06 RX ORDER — CYCLOPENTOLAT/TROPIC/PHENYLEPH 1%-1%-2.5%
1 DROPS (EA) OPHTHALMIC (EYE)
Status: COMPLETED | OUTPATIENT
Start: 2023-02-06 | End: 2023-02-06

## 2023-02-06 RX ORDER — LIDOCAINE 40 MG/G
CREAM TOPICAL
Status: DISCONTINUED | OUTPATIENT
Start: 2023-02-06 | End: 2023-02-07 | Stop reason: HOSPADM

## 2023-02-06 RX ORDER — ONDANSETRON 4 MG/1
4 TABLET, ORALLY DISINTEGRATING ORAL EVERY 30 MIN PRN
Status: DISCONTINUED | OUTPATIENT
Start: 2023-02-06 | End: 2023-02-07 | Stop reason: HOSPADM

## 2023-02-06 RX ORDER — PROPARACAINE HYDROCHLORIDE 5 MG/ML
1 SOLUTION/ DROPS OPHTHALMIC ONCE
Status: COMPLETED | OUTPATIENT
Start: 2023-02-06 | End: 2023-02-06

## 2023-02-06 RX ORDER — KETOROLAC TROMETHAMINE 5 MG/ML
SOLUTION OPHTHALMIC
Qty: 5 ML | Refills: 0 | Status: SHIPPED | OUTPATIENT
Start: 2023-02-06

## 2023-02-06 RX ORDER — FENTANYL CITRATE 50 UG/ML
25 INJECTION, SOLUTION INTRAMUSCULAR; INTRAVENOUS EVERY 5 MIN PRN
Status: DISCONTINUED | OUTPATIENT
Start: 2023-02-06 | End: 2023-02-07 | Stop reason: HOSPADM

## 2023-02-06 RX ORDER — LABETALOL HYDROCHLORIDE 5 MG/ML
10 INJECTION, SOLUTION INTRAVENOUS
Status: DISCONTINUED | OUTPATIENT
Start: 2023-02-06 | End: 2023-02-07 | Stop reason: HOSPADM

## 2023-02-06 RX ORDER — ONDANSETRON 2 MG/ML
4 INJECTION INTRAMUSCULAR; INTRAVENOUS EVERY 30 MIN PRN
Status: DISCONTINUED | OUTPATIENT
Start: 2023-02-06 | End: 2023-02-07 | Stop reason: HOSPADM

## 2023-02-06 RX ORDER — FENTANYL CITRATE 50 UG/ML
INJECTION, SOLUTION INTRAMUSCULAR; INTRAVENOUS PRN
Status: DISCONTINUED | OUTPATIENT
Start: 2023-02-06 | End: 2023-02-06

## 2023-02-06 RX ORDER — MOXIFLOXACIN IN NACL,ISO-OS/PF 0.3MG/0.3
SYRINGE (ML) INTRAOCULAR PRN
Status: DISCONTINUED | OUTPATIENT
Start: 2023-02-06 | End: 2023-02-06 | Stop reason: HOSPADM

## 2023-02-06 RX ORDER — TETRACAINE HYDROCHLORIDE 5 MG/ML
SOLUTION OPHTHALMIC PRN
Status: DISCONTINUED | OUTPATIENT
Start: 2023-02-06 | End: 2023-02-06 | Stop reason: HOSPADM

## 2023-02-06 RX ORDER — PREDNISOLONE ACETATE 10 MG/ML
1 SUSPENSION/ DROPS OPHTHALMIC 4 TIMES DAILY
Qty: 10 ML | Refills: 1 | Status: SHIPPED | OUTPATIENT
Start: 2023-02-06

## 2023-02-06 RX ORDER — FENTANYL CITRATE 50 UG/ML
50 INJECTION, SOLUTION INTRAMUSCULAR; INTRAVENOUS EVERY 5 MIN PRN
Status: DISCONTINUED | OUTPATIENT
Start: 2023-02-06 | End: 2023-02-07 | Stop reason: HOSPADM

## 2023-02-06 RX ORDER — LIDOCAINE HYDROCHLORIDE 10 MG/ML
INJECTION, SOLUTION EPIDURAL; INFILTRATION; INTRACAUDAL; PERINEURAL PRN
Status: DISCONTINUED | OUTPATIENT
Start: 2023-02-06 | End: 2023-02-06 | Stop reason: HOSPADM

## 2023-02-06 RX ORDER — TIMOLOL MALEATE 5 MG/ML
SOLUTION/ DROPS OPHTHALMIC PRN
Status: DISCONTINUED | OUTPATIENT
Start: 2023-02-06 | End: 2023-02-06 | Stop reason: HOSPADM

## 2023-02-06 RX ORDER — MOXIFLOXACIN 5 MG/ML
1 SOLUTION/ DROPS OPHTHALMIC 3 TIMES DAILY
Qty: 3 ML | Refills: 1 | Status: SHIPPED | OUTPATIENT
Start: 2023-02-06

## 2023-02-06 RX ORDER — DEXAMETHASONE SODIUM PHOSPHATE 4 MG/ML
INJECTION, SOLUTION INTRA-ARTICULAR; INTRALESIONAL; INTRAMUSCULAR; INTRAVENOUS; SOFT TISSUE PRN
Status: DISCONTINUED | OUTPATIENT
Start: 2023-02-06 | End: 2023-02-06 | Stop reason: HOSPADM

## 2023-02-06 RX ORDER — ACETAMINOPHEN 325 MG/1
975 TABLET ORAL ONCE
Status: COMPLETED | OUTPATIENT
Start: 2023-02-06 | End: 2023-02-06

## 2023-02-06 RX ADMIN — Medication 1 DROP: at 10:39

## 2023-02-06 RX ADMIN — PROPARACAINE HYDROCHLORIDE 1 DROP: 5 SOLUTION/ DROPS OPHTHALMIC at 10:22

## 2023-02-06 RX ADMIN — SODIUM CHLORIDE, SODIUM LACTATE, POTASSIUM CHLORIDE, CALCIUM CHLORIDE: 600; 310; 30; 20 INJECTION, SOLUTION INTRAVENOUS at 11:33

## 2023-02-06 RX ADMIN — FENTANYL CITRATE 25 MCG: 50 INJECTION, SOLUTION INTRAMUSCULAR; INTRAVENOUS at 11:33

## 2023-02-06 RX ADMIN — Medication 1 DROP: at 10:35

## 2023-02-06 RX ADMIN — Medication 1 DROP: at 10:27

## 2023-02-06 RX ADMIN — ACETAMINOPHEN 975 MG: 325 TABLET ORAL at 10:26

## 2023-02-06 ASSESSMENT — TONOMETRY
OS_IOP_MMHG: 19
IOP_METHOD: TONOPEN

## 2023-02-06 ASSESSMENT — VISUAL ACUITY
METHOD: SNELLEN - LINEAR
OS_SC: CF 3'

## 2023-02-06 ASSESSMENT — SLIT LAMP EXAM - LIDS: COMMENTS: NORMAL
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
## 2023-02-06 NOTE — PROGRESS NOTES
POD#0, status post cataract surgery, left eye     Impression/Plan:  Pseudophakia, left eye: POD0, good post-operative appearance. IOP reasonable.     Eye protection at all times and eye shield at night for 1 week.     Limited activities with no exercise or heavy lifting for 1 week.     Instructed patient to contact us for decreasing vision, eye pain, new floaters or flashes of light or other concerning symptoms.     Written instructions given    Drops:  Moxifloxacin TID for 7 days in operative eye  Prednisolone 4/3/2/1 taper every 7 days in operative eye  Ketorolac QID for 14 days in operative eye    Follow up scheduled on 2/28/23    All questions were answered    Idania Carrera MD  Ophthalmology Resident, PGY-4  [facility]     Attending Physician Attestation:  Complete documentation of historical and exam elements from today's encounter can be found in the full encounter summary report (not reduplicated in this progress note).  I personally obtained the chief complaint(s) and history of present illness.  I confirmed and edited as necessary the review of systems, past medical/surgical history, family history, social history, and examination findings as documented by others; and I examined the patient myself.  I personally reviewed the relevant tests, images, and reports as documented above.  I formulated and edited as necessary the assessment and plan and discussed the findings and management plan with the patient and family. . - Alonso Mcdaniel MD

## 2023-02-06 NOTE — OP NOTE
PREOPERATIVE DIAGNOSIS:   1. Anatomical narrow angle borderline glaucoma of both eyes    2. Nuclear senile cataract left eye       POSTOPERATIVE DIAGNOSIS: Same   PROCEDURES:   1. Cataract extraction with intraocular lens implant Left eye.  SURGEON: Alonso Mcdaniel M.D.  Assistant: Idania Carrera MD   INDICATIONS: The patient Erika Kelly presented to the eye clinic with decreased vision secondary to cataract in the Left eye. The risks, benefits and alternatives to cataract extraction were discussed. The patient elected to proceed. All questions were answered to the patient's satisfaction.   DESCRIPTION OF PROCEDURE:   Prior to the procedure, appropriate cardiac and respiratory monitors were applied to the patient.  In the pre-operative holding area, a drop of topical tetracaine followed by lidocaine gel followed by povidone iodine.  The patient was brought to the operating room where a surgical pause was carried out to identify with all members of the surgical team the correct surgical site.  With adequate anesthesia, the Left eye was prepped and draped in the usual sterile fashion. A lid speculum was placed, and the operating microscope was rotated into position. A paracentesis was created.  Through this limbal paracentesis, the anterior chamber was filled with preservative-free lidocaine followed by viscoelastic.  A temporal wound was created at the limbus using a 2.6 mm blade. A capsulorrhexis was initiated using a bent 25-gauge needle and was completed in continuous and circular fashion using the capsulorrhexis forceps. The lens nucleus was hydrodissected using balanced salt solution.  The lens nucleus was rotated and removed using phacoemulsification in a stop and chop technique.  Residual cortical material was removed using irrigation-aspiration.  The capsular bag was reinflated to its maximal extent with cohesive viscoelastic.  A 29.0 diopter [id number] inserted into the capsular bag.  The lens power  selected was reviewed using the intraocular lens power measurements that were obtained preoperatively to confirm that the correct lens was selected for the desired post-operative refractive state. The residual viscoelastic was removed in its entirety, the wound were hydrated and found to be self-sealing.  Intracameral moxifloxacin was administered. Tactile pressure was confirmed to be in a normal range.  Subconjunctival Dexamethasone (2 mg) was injected.. The lid speculum was removed and a patch and shield were applied.  The patient tolerated the procedure well, and there were no complications.    PLAN: The patient will be discharged to home and will follow up tomorrow morning in the eye clinic.  EBL:  None  Complications:  None  Implant Name Type Inv. Item Serial No.  Lot No. LRB No. Used Action   LENS [id number] [employer] UV ASPHERIC BICONVEX IOL - [id number] Lens/Eye Implant LENS [id number] [employer] UV ASPHERIC BICONVEX IOL [id number] [employer]  Left 1 Implanted          Attending Physician Procedure Attestation: I was present for the entire procedure       Alonso Mcdaniel MD  , Comprehensive Ophthalmology  Department of Ophthalmology and Visual Neurosciences  [facility]

## 2023-02-06 NOTE — ANESTHESIA CARE TRANSFER NOTE
Patient: Erika Kelly    Procedure: Procedure(s):  LEFT EYE PHACOEMULSIFICATION, CATARACT, WITH STANDARD INTRAOCULAR LENS IMPLANT INSERTION       Diagnosis: Hyperopia of both eyes with astigmatism and presbyopia [H52.03, H52.203, H52.4]  Nuclear senile cataract of both eyes [H25.13]  Anatomical narrow angle borderline glaucoma of both eyes [H40.033]  Diagnosis Additional Information: No value filed.    Anesthesia Type:   MAC     Note:    Oropharynx: oropharynx clear of all foreign objects  Level of Consciousness: awake  Oxygen Supplementation: room air    Independent Airway: airway patency satisfactory and stable  Dentition: dentition unchanged  Vital Signs Stable: post-procedure vital signs reviewed and stable  Report to RN Given: handoff report given  Patient transferred to: Phase II    Handoff Report: Identifed the Patient, Identified the Reponsible Provider, Reviewed the pertinent medical history, Discussed the surgical course, Reviewed Intra-OP anesthesia mangement and issues during anesthesia, Set expectations for post-procedure period and Allowed opportunity for questions and acknowledgement of understanding      Vitals:  Vitals Value Taken Time   BP     Temp     Pulse     Resp     SpO2         Electronically Signed By: PATTIE Mullins CRNA  February 6, 2023  12:15 PM

## 2023-02-06 NOTE — DISCHARGE INSTRUCTIONS
[facility]  Home Care Following Anesthesia  For 24 hours after surgery:  Get plenty of rest.  A responsible adult must stay with you for at least 24 hours after you leave the surgery center.  Do not drive or use heavy equipment.  If you have weakness or tingling, don't drive or use heavy equipment until this feeling goes away.   Do not drink alcohol.   Avoid strenuous or risky activities.  Ask for help when climbing stairs.  You may feel lightheaded.  IF so, sit for a few minutes before standing.  Have someone help you get up.   If you have nausea (feel sick to your stomach): Drink only clear liquids such as apple juice, ginger ale, broth or 7-Up.  Rest may also help.  Be sure to drink enough fluids.  Move to a regular diet as you feel able.   You may have a slight fever.  Call the doctor if your fever is over 100 F (37.7 C) (taken under the tongue) or lasts longer than 24 hours.  You may have a dry mouth, a sore throat, muscle aches or trouble sleeping. These should go away after 24 hours.  Do not make important or legal decisions.   It is recommended to avoid smoking.               Tips for taking pain medications  To get the best pain relief possible, remember these points:  Take pain medications as directed, before pain becomes severe.  Pain medication can upset your stomach: taking it with food may help.  Constipation is a common side effect of pain medication. Drink plenty of  fluids.  Eat foods high in fiber. Take a stool softener if recommended by your doctor or pharmacist.  Do not drink alcohol, drive or operate machinery while taking pain medications.  Ask about other ways to control pain, such as with heat, ice or relaxation.    Tylenol/Acetaminophen Consumption  To help encourage the safe use of acetaminophen, the makers of TYLENOL  have lowered the maximum daily dose for single-ingredient Extra Strength TYLENOL  (acetaminophen) products sold in the [place] from 8 pills  per day (4,000 mg) to 6 pills per day (3,000 mg). The dosing interval has also changed from 2 pills every 4-6 hours to 2 pills every 6 hours.  If you feel your pain relief is insufficient, you may take Tylenol/Acetaminophen in addition to your narcotic pain medication.   Be careful not to exceed 3,000 mg of Tylenol/Acetaminophen in a 24 hour period from all sources.  If you are taking extra strength Tylenol/acetaminophen (500 mg), the maximum dose is 6 tablets in 24 hours.  If you are taking regular strength acetaminophen (325 mg), the maximum dose is 9 tablets in 24 hours.    Call a doctor for any of the following:  Signs of infection (fever, growing tenderness at the surgery site, a large amount of drainage or bleeding, severe pain, foul-smelling drainage, redness, swelling).  It has been over 8 to 10 hours since surgery and you are still not able to urinate (pass water).  Headache for over 24 hours.  Numbness, tingling or weakness the day after surgery (if you had spinal anesthesia).  Signs of Covid-19 infection (temperature over 100 degrees, shortness of breath, cough, loss of taste/smell, generalized body aches, persistent headache, chills, sore throat, nausea/vomiting/diarrhea)  Your doctor is:       Dr. Alonso Mcdaniel, Ophthalmology: [phone]               Or dial [phone] and ask for the resident on call for:  Ophthalmology  For emergency care, call the:  [facility]:  [phone] (TTY for hearing impaired: [phone])

## 2023-02-28 ENCOUNTER — OFFICE VISIT (OUTPATIENT)
Dept: OPHTHALMOLOGY | Facility: CLINIC | Age: 78
End: 2023-02-28
Attending: OPHTHALMOLOGY
Payer: COMMERCIAL

## 2023-02-28 DIAGNOSIS — Z96.1 PSEUDOPHAKIA OF BOTH EYES: ICD-10-CM

## 2023-02-28 DIAGNOSIS — Z98.890 POSTOPERATIVE EYE STATE: Primary | ICD-10-CM

## 2023-02-28 DIAGNOSIS — H53.022 REFRACTIVE AMBLYOPIA OF LEFT EYE: ICD-10-CM

## 2023-02-28 DIAGNOSIS — H40.033 ANATOMICAL NARROW ANGLE BORDERLINE GLAUCOMA OF BOTH EYES: ICD-10-CM

## 2023-02-28 PROCEDURE — G0463 HOSPITAL OUTPT CLINIC VISIT: HCPCS | Mod: 25

## 2023-02-28 PROCEDURE — 92015 DETERMINE REFRACTIVE STATE: CPT

## 2023-02-28 PROCEDURE — 99024 POSTOP FOLLOW-UP VISIT: CPT | Mod: GC | Performed by: OPHTHALMOLOGY

## 2023-02-28 ASSESSMENT — CONF VISUAL FIELD
OS_SUPERIOR_TEMPORAL_RESTRICTION: 0
OD_INFERIOR_NASAL_RESTRICTION: 0
METHOD: COUNTING FINGERS
OD_SUPERIOR_TEMPORAL_RESTRICTION: 0
OS_INFERIOR_TEMPORAL_RESTRICTION: 0
OD_INFERIOR_TEMPORAL_RESTRICTION: 0
OD_SUPERIOR_NASAL_RESTRICTION: 0
OS_SUPERIOR_NASAL_RESTRICTION: 0
OS_INFERIOR_NASAL_RESTRICTION: 0
OD_NORMAL: 1
OS_NORMAL: 1

## 2023-02-28 ASSESSMENT — TONOMETRY
OS_IOP_MMHG: 22
OD_IOP_MMHG: 20
IOP_METHOD: TONOPEN

## 2023-02-28 ASSESSMENT — SLIT LAMP EXAM - LIDS
COMMENTS: NORMAL
COMMENTS: NORMAL

## 2023-02-28 ASSESSMENT — CUP TO DISC RATIO
OS_RATIO: 0.2
OD_RATIO: 0.2

## 2023-02-28 ASSESSMENT — VISUAL ACUITY
OS_SC: 20/400
OD_PH_SC: 20/25
OD_SC+: -2
OD_SC: 20/40
METHOD: SNELLEN - LINEAR

## 2023-02-28 ASSESSMENT — REFRACTION_MANIFEST
OD_CYLINDER: +0.50
OD_ADD: +2.50
OD_SPHERE: -1.25
OS_SPHERE: -1.25
OS_AXIS: 170
OS_CYLINDER: +1.00
OS_ADD: +2.50
OD_AXIS: 145

## 2023-02-28 ASSESSMENT — EXTERNAL EXAM - LEFT EYE: OS_EXAM: NORMAL

## 2023-02-28 ASSESSMENT — EXTERNAL EXAM - RIGHT EYE: OD_EXAM: NORMAL

## 2023-02-28 NOTE — PROGRESS NOTES
"Chief Complaint(s) and History of Present Illness(es)     Post Op (Ophthalmology) Both Eyes            Comments: S/p CE/IOL BE          Comments    Pt states that she preferred her vision prior to surgery. Pt notes seeing   a \"hair\" peripherally in her RE, but there is nothing there.  No eye pain today. No flashes or floaters. Pt still having redness in RE,   improving slowly.    WilmercamiREYNALDO Davies February 28, 2023 1:04 PM                   Review of systems for the eyes was negative other than the pertinent positives/negatives listed in the HPI.      Assessment & Plan      Erika Kelly is a 77 year old female with the following diagnoses:   1. Postoperative eye state    2. Pseudophakia of both eyes    3. Anatomical narrow angle borderline glaucoma of both eyes    4. Refractive amblyopia of left eye       1/16/23 CEIOL in the right eye  2/6/23 CEIOL in the left eye   History of amblyopia in the left eye   For narrow anatomical angle    Doing well, 20/20 in the right eye and 20/300 in the left eye with manifest refraction today  Reassurance given  Refractive options reviewed  Refraction given  Monocular precautions encouraged  Follow up in one year or sooner prn     Patient disposition:   Return in about 1 year (around 2/28/2024) for DFE.          Krys Lorenz MD  PGY-4 Resident Physician  Department of Ophthalmology    Attending Physician Attestation:  Complete documentation of historical and exam elements from today's encounter can be found in the full encounter summary report (not reduplicated in this progress note).  I personally obtained the chief complaint(s) and history of present illness.  I confirmed and edited as necessary the review of systems, past medical/surgical history, family history, social history, and examination findings as documented by others; and I examined the patient myself.  I personally reviewed the relevant tests, images, and reports as documented above.  I formulated and edited " as necessary the assessment and plan and discussed the findings and management plan with the patient and family. . - Alonso Mcdaniel MD

## 2023-02-28 NOTE — NURSING NOTE
"Chief Complaints and History of Present Illnesses   Patient presents with     Post Op (Ophthalmology) Both Eyes     S/p CE/IOL BE     Chief Complaint(s) and History of Present Illness(es)     Post Op (Ophthalmology) Both Eyes            Comments: S/p CE/IOL BE          Comments    Pt states that she preferred her vision prior to surgery. Pt notes seeing a \"hair\" peripherally in her RE, but there is nothing there.  No eye pain today. No flashes or floaters. Pt still having redness in RE, improving slowly.    REYNALDO Torrez February 28, 2023 1:04 PM                       "

## 2023-11-20 NOTE — TELEPHONE ENCOUNTER
Reason for visit: Cystoscopy         Relevant information: neurogenic bladder    Records/imaging/labs/orders: all records available    Pt called: no need for a call    At Rooming: ask symptoms, collect a urine/dip    Josette Garcia CMA  4/21/2022  1:15 PM  
PROVIDER:[TOKEN:[[id number]]]

## 2024-05-30 ENCOUNTER — LAB REQUISITION (OUTPATIENT)
Dept: LAB | Facility: CLINIC | Age: 79
End: 2024-05-30
Payer: COMMERCIAL

## 2024-05-30 DIAGNOSIS — L68.9 HYPERTRICHOSIS, UNSPECIFIED: ICD-10-CM

## 2024-05-30 DIAGNOSIS — R41.82 ALTERED MENTAL STATUS, UNSPECIFIED: ICD-10-CM

## 2024-05-31 LAB
ANION GAP SERPL CALCULATED.3IONS-SCNC: 11 MMOL/L (ref 7–15)
BASOPHILS # BLD AUTO: 0.1 10E3/UL (ref 0–0.2)
BASOPHILS NFR BLD AUTO: 1 %
BUN SERPL-MCNC: 17.7 MG/DL (ref 8–23)
CALCIUM SERPL-MCNC: 8.9 MG/DL (ref 8.8–10.2)
CHLORIDE SERPL-SCNC: 107 MMOL/L (ref 98–107)
CREAT SERPL-MCNC: 0.46 MG/DL (ref 0.51–0.95)
DEPRECATED HCO3 PLAS-SCNC: 21 MMOL/L (ref 22–29)
EGFRCR SERPLBLD CKD-EPI 2021: >90 ML/MIN/1.73M2
EOSINOPHIL # BLD AUTO: 0.6 10E3/UL (ref 0–0.7)
EOSINOPHIL NFR BLD AUTO: 8 %
ERYTHROCYTE [DISTWIDTH] IN BLOOD BY AUTOMATED COUNT: 15.3 % (ref 10–15)
HCT VFR BLD AUTO: 35.4 % (ref 35–47)
HGB BLD-MCNC: 10.9 G/DL (ref 11.7–15.7)
IMM GRANULOCYTES # BLD: 0 10E3/UL
IMM GRANULOCYTES NFR BLD: 0 %
LYMPHOCYTES # BLD AUTO: 1.9 10E3/UL (ref 0.8–5.3)
LYMPHOCYTES NFR BLD AUTO: 26 %
MCH RBC QN AUTO: 25.5 PG (ref 26.5–33)
MCHC RBC AUTO-ENTMCNC: 30.8 G/DL (ref 31.5–36.5)
MCV RBC AUTO: 83 FL (ref 78–100)
MONOCYTES # BLD AUTO: 0.5 10E3/UL (ref 0–1.3)
MONOCYTES NFR BLD AUTO: 6 %
NEUTROPHILS # BLD AUTO: 4.3 10E3/UL (ref 1.6–8.3)
NEUTROPHILS NFR BLD AUTO: 59 %
NRBC # BLD AUTO: 0 10E3/UL
NRBC BLD AUTO-RTO: 0 /100
PLATELET # BLD AUTO: 373 10E3/UL (ref 150–450)
POTASSIUM SERPL-SCNC: 4.4 MMOL/L (ref 3.4–5.3)
RBC # BLD AUTO: 4.28 10E6/UL (ref 3.8–5.2)
SODIUM SERPL-SCNC: 139 MMOL/L (ref 135–145)
WBC # BLD AUTO: 7.3 10E3/UL (ref 4–11)

## 2024-05-31 PROCEDURE — 80048 BASIC METABOLIC PNL TOTAL CA: CPT | Mod: ORL | Performed by: NURSE PRACTITIONER

## 2024-05-31 PROCEDURE — 85025 COMPLETE CBC W/AUTO DIFF WBC: CPT | Mod: ORL | Performed by: NURSE PRACTITIONER

## 2024-05-31 PROCEDURE — P9604 ONE-WAY ALLOW PRORATED TRIP: HCPCS | Mod: ORL | Performed by: NURSE PRACTITIONER

## 2024-05-31 PROCEDURE — 36415 COLL VENOUS BLD VENIPUNCTURE: CPT | Mod: ORL | Performed by: NURSE PRACTITIONER

## 2024-06-01 LAB — GLUCOSE SERPL-MCNC: 116 MG/DL (ref 70–99)

## 2024-06-04 ENCOUNTER — LAB REQUISITION (OUTPATIENT)
Dept: LAB | Facility: CLINIC | Age: 79
End: 2024-06-04
Payer: COMMERCIAL

## 2024-06-04 DIAGNOSIS — N39.0 URINARY TRACT INFECTION, SITE NOT SPECIFIED: ICD-10-CM

## 2024-06-04 LAB
ALBUMIN UR-MCNC: 300 MG/DL
APPEARANCE UR: ABNORMAL
BACTERIA #/AREA URNS HPF: ABNORMAL /HPF
BILIRUB UR QL STRIP: NEGATIVE
COLOR UR AUTO: ABNORMAL
GLUCOSE UR STRIP-MCNC: NEGATIVE MG/DL
HGB UR QL STRIP: ABNORMAL
KETONES UR STRIP-MCNC: NEGATIVE MG/DL
LEUKOCYTE ESTERASE UR QL STRIP: ABNORMAL
NITRATE UR QL: POSITIVE
PH UR STRIP: 8 [PH] (ref 5–7)
RBC URINE: 41 /HPF
SP GR UR STRIP: 1.01 (ref 1–1.03)
TRANSITIONAL EPI: 2 /HPF
UROBILINOGEN UR STRIP-MCNC: NORMAL MG/DL
WBC CLUMPS #/AREA URNS HPF: PRESENT /HPF
WBC URINE: >182 /HPF

## 2024-06-04 PROCEDURE — 87086 URINE CULTURE/COLONY COUNT: CPT | Mod: ORL

## 2024-06-04 PROCEDURE — 87186 SC STD MICRODIL/AGAR DIL: CPT | Mod: ORL

## 2024-06-04 PROCEDURE — 81001 URINALYSIS AUTO W/SCOPE: CPT | Mod: ORL

## 2024-06-08 LAB
BACTERIA UR CULT: ABNORMAL
BACTERIA UR CULT: ABNORMAL

## 2024-07-22 ENCOUNTER — LAB REQUISITION (OUTPATIENT)
Dept: LAB | Facility: CLINIC | Age: 79
End: 2024-07-22
Payer: COMMERCIAL

## 2024-07-22 DIAGNOSIS — N31.9 NEUROMUSCULAR DYSFUNCTION OF BLADDER, UNSPECIFIED: ICD-10-CM

## 2024-07-22 LAB
ALBUMIN UR-MCNC: 30 MG/DL
APPEARANCE UR: ABNORMAL
BILIRUB UR QL STRIP: NEGATIVE
CAOX CRY #/AREA URNS HPF: ABNORMAL /HPF
COLOR UR AUTO: YELLOW
GLUCOSE UR STRIP-MCNC: NEGATIVE MG/DL
HGB UR QL STRIP: ABNORMAL
KETONES UR STRIP-MCNC: NEGATIVE MG/DL
LEUKOCYTE ESTERASE UR QL STRIP: ABNORMAL
NITRATE UR QL: NEGATIVE
PH UR STRIP: 6 [PH] (ref 5–7)
RBC URINE: 15 /HPF
SP GR UR STRIP: 1.02 (ref 1–1.03)
SQUAMOUS EPITHELIAL: 1 /HPF
UROBILINOGEN UR STRIP-MCNC: NORMAL MG/DL
WBC CLUMPS #/AREA URNS HPF: PRESENT /HPF
WBC URINE: >182 /HPF

## 2024-07-22 PROCEDURE — 81001 URINALYSIS AUTO W/SCOPE: CPT | Mod: ORL | Performed by: NURSE PRACTITIONER

## 2024-07-22 PROCEDURE — 87086 URINE CULTURE/COLONY COUNT: CPT | Mod: ORL | Performed by: NURSE PRACTITIONER

## 2024-07-24 LAB — BACTERIA UR CULT: NORMAL

## 2024-07-31 ENCOUNTER — LAB REQUISITION (OUTPATIENT)
Dept: LAB | Facility: CLINIC | Age: 79
End: 2024-07-31
Payer: COMMERCIAL

## 2024-07-31 DIAGNOSIS — E11.22 TYPE 2 DIABETES MELLITUS WITH DIABETIC CHRONIC KIDNEY DISEASE (H): ICD-10-CM

## 2024-07-31 DIAGNOSIS — R94.6 ABNORMAL RESULTS OF THYROID FUNCTION STUDIES: ICD-10-CM

## 2024-07-31 DIAGNOSIS — R82.90 UNSPECIFIED ABNORMAL FINDINGS IN URINE: ICD-10-CM

## 2024-07-31 DIAGNOSIS — R82.993 HYPERURICOSURIA: ICD-10-CM

## 2024-07-31 LAB
ALBUMIN UR-MCNC: 100 MG/DL
APPEARANCE UR: ABNORMAL
BILIRUB UR QL STRIP: NEGATIVE
COLOR UR AUTO: ABNORMAL
GLUCOSE UR STRIP-MCNC: NEGATIVE MG/DL
HGB UR QL STRIP: ABNORMAL
KETONES UR STRIP-MCNC: NEGATIVE MG/DL
LEUKOCYTE ESTERASE UR QL STRIP: ABNORMAL
NITRATE UR QL: NEGATIVE
PH UR STRIP: 5 [PH] (ref 5–7)
RBC URINE: 84 /HPF
SP GR UR STRIP: 1.03 (ref 1–1.03)
UROBILINOGEN UR STRIP-MCNC: NORMAL MG/DL
WBC CLUMPS #/AREA URNS HPF: PRESENT /HPF
WBC URINE: >182 /HPF

## 2024-07-31 PROCEDURE — 81001 URINALYSIS AUTO W/SCOPE: CPT | Mod: ORL | Performed by: NURSE PRACTITIONER

## 2024-07-31 PROCEDURE — 87086 URINE CULTURE/COLONY COUNT: CPT | Mod: ORL | Performed by: NURSE PRACTITIONER

## 2024-07-31 PROCEDURE — 87186 SC STD MICRODIL/AGAR DIL: CPT | Mod: ORL | Performed by: NURSE PRACTITIONER

## 2024-08-01 LAB
ANION GAP SERPL CALCULATED.3IONS-SCNC: 10 MMOL/L (ref 7–15)
BASOPHILS # BLD AUTO: 0 10E3/UL (ref 0–0.2)
BASOPHILS NFR BLD AUTO: 1 %
BUN SERPL-MCNC: 17.2 MG/DL (ref 8–23)
CALCIUM SERPL-MCNC: 8.9 MG/DL (ref 8.8–10.4)
CHLORIDE SERPL-SCNC: 107 MMOL/L (ref 98–107)
CHOLEST SERPL-MCNC: 144 MG/DL
CREAT SERPL-MCNC: 0.44 MG/DL (ref 0.51–0.95)
EGFRCR SERPLBLD CKD-EPI 2021: >90 ML/MIN/1.73M2
EOSINOPHIL # BLD AUTO: 0.3 10E3/UL (ref 0–0.7)
EOSINOPHIL NFR BLD AUTO: 6 %
ERYTHROCYTE [DISTWIDTH] IN BLOOD BY AUTOMATED COUNT: 17 % (ref 10–15)
FASTING STATUS PATIENT QL REPORTED: ABNORMAL
GLUCOSE SERPL-MCNC: 95 MG/DL (ref 70–99)
HBA1C MFR BLD: 5.4 %
HCO3 SERPL-SCNC: 26 MMOL/L (ref 22–29)
HCT VFR BLD AUTO: 38.5 % (ref 35–47)
HDLC SERPL-MCNC: 33 MG/DL
HGB BLD-MCNC: 12 G/DL (ref 11.7–15.7)
IMM GRANULOCYTES # BLD: 0 10E3/UL
IMM GRANULOCYTES NFR BLD: 1 %
LDLC SERPL CALC-MCNC: 85 MG/DL
LYMPHOCYTES # BLD AUTO: 1.6 10E3/UL (ref 0.8–5.3)
LYMPHOCYTES NFR BLD AUTO: 27 %
MCH RBC QN AUTO: 26 PG (ref 26.5–33)
MCHC RBC AUTO-ENTMCNC: 31.2 G/DL (ref 31.5–36.5)
MCV RBC AUTO: 83 FL (ref 78–100)
MONOCYTES # BLD AUTO: 0.5 10E3/UL (ref 0–1.3)
MONOCYTES NFR BLD AUTO: 9 %
NEUTROPHILS # BLD AUTO: 3.3 10E3/UL (ref 1.6–8.3)
NEUTROPHILS NFR BLD AUTO: 56 %
NONHDLC SERPL-MCNC: 111 MG/DL
NRBC # BLD AUTO: 0 10E3/UL
NRBC BLD AUTO-RTO: 0 /100
PLATELET # BLD AUTO: 303 10E3/UL (ref 150–450)
POTASSIUM SERPL-SCNC: 4.2 MMOL/L (ref 3.4–5.3)
RBC # BLD AUTO: 4.62 10E6/UL (ref 3.8–5.2)
SODIUM SERPL-SCNC: 143 MMOL/L (ref 135–145)
TRIGL SERPL-MCNC: 131 MG/DL
TSH SERPL DL<=0.005 MIU/L-ACNC: 1.91 UIU/ML (ref 0.3–4.2)
WBC # BLD AUTO: 5.8 10E3/UL (ref 4–11)

## 2024-08-01 PROCEDURE — 85025 COMPLETE CBC W/AUTO DIFF WBC: CPT | Mod: ORL | Performed by: NURSE PRACTITIONER

## 2024-08-01 PROCEDURE — P9604 ONE-WAY ALLOW PRORATED TRIP: HCPCS | Mod: ORL | Performed by: NURSE PRACTITIONER

## 2024-08-01 PROCEDURE — 80048 BASIC METABOLIC PNL TOTAL CA: CPT | Mod: ORL | Performed by: NURSE PRACTITIONER

## 2024-08-01 PROCEDURE — 80061 LIPID PANEL: CPT | Mod: ORL | Performed by: NURSE PRACTITIONER

## 2024-08-01 PROCEDURE — 84443 ASSAY THYROID STIM HORMONE: CPT | Mod: ORL | Performed by: NURSE PRACTITIONER

## 2024-08-01 PROCEDURE — 83036 HEMOGLOBIN GLYCOSYLATED A1C: CPT | Mod: ORL | Performed by: NURSE PRACTITIONER

## 2024-08-01 PROCEDURE — 36415 COLL VENOUS BLD VENIPUNCTURE: CPT | Mod: ORL | Performed by: NURSE PRACTITIONER

## 2024-08-02 ENCOUNTER — LAB REQUISITION (OUTPATIENT)
Dept: LAB | Facility: CLINIC | Age: 79
End: 2024-08-02
Payer: COMMERCIAL

## 2024-08-02 DIAGNOSIS — L89.324 PRESSURE ULCER OF LEFT BUTTOCK, STAGE 4 (H): ICD-10-CM

## 2024-08-03 LAB
ANION GAP SERPL CALCULATED.3IONS-SCNC: 8 MMOL/L (ref 7–15)
BACTERIA UR CULT: ABNORMAL
BACTERIA UR CULT: ABNORMAL
BUN SERPL-MCNC: 17.9 MG/DL (ref 8–23)
CALCIUM SERPL-MCNC: 8.9 MG/DL (ref 8.8–10.4)
CHLORIDE SERPL-SCNC: 107 MMOL/L (ref 98–107)
CREAT SERPL-MCNC: 0.39 MG/DL (ref 0.51–0.95)
EGFRCR SERPLBLD CKD-EPI 2021: >90 ML/MIN/1.73M2
ERYTHROCYTE [DISTWIDTH] IN BLOOD BY AUTOMATED COUNT: 16.8 % (ref 10–15)
GLUCOSE SERPL-MCNC: 89 MG/DL (ref 70–99)
HCO3 SERPL-SCNC: 26 MMOL/L (ref 22–29)
HCT VFR BLD AUTO: 39.8 % (ref 35–47)
HGB BLD-MCNC: 12.5 G/DL (ref 11.7–15.7)
MCH RBC QN AUTO: 26.4 PG (ref 26.5–33)
MCHC RBC AUTO-ENTMCNC: 31.4 G/DL (ref 31.5–36.5)
MCV RBC AUTO: 84 FL (ref 78–100)
NT-PROBNP SERPL-MCNC: 93 PG/ML (ref 0–1800)
PLATELET # BLD AUTO: 308 10E3/UL (ref 150–450)
POTASSIUM SERPL-SCNC: 3.6 MMOL/L (ref 3.4–5.3)
RBC # BLD AUTO: 4.73 10E6/UL (ref 3.8–5.2)
SODIUM SERPL-SCNC: 141 MMOL/L (ref 135–145)
WBC # BLD AUTO: 6.8 10E3/UL (ref 4–11)

## 2024-08-03 PROCEDURE — 80048 BASIC METABOLIC PNL TOTAL CA: CPT | Mod: ORL | Performed by: NURSE PRACTITIONER

## 2024-08-03 PROCEDURE — 83880 ASSAY OF NATRIURETIC PEPTIDE: CPT | Mod: ORL | Performed by: NURSE PRACTITIONER

## 2024-08-03 PROCEDURE — 85027 COMPLETE CBC AUTOMATED: CPT | Mod: ORL | Performed by: NURSE PRACTITIONER

## 2024-08-03 PROCEDURE — P9603 ONE-WAY ALLOW PRORATED MILES: HCPCS | Mod: ORL | Performed by: NURSE PRACTITIONER

## 2024-08-13 ENCOUNTER — LAB REQUISITION (OUTPATIENT)
Dept: LAB | Facility: CLINIC | Age: 79
End: 2024-08-13
Payer: COMMERCIAL

## 2024-08-13 DIAGNOSIS — L89.324 PRESSURE ULCER OF LEFT BUTTOCK, STAGE 4 (H): ICD-10-CM

## 2024-09-21 ENCOUNTER — LAB REQUISITION (OUTPATIENT)
Dept: LAB | Facility: CLINIC | Age: 79
End: 2024-09-21
Payer: COMMERCIAL

## 2024-09-21 DIAGNOSIS — I10 ESSENTIAL (PRIMARY) HYPERTENSION: ICD-10-CM

## 2024-09-24 LAB
ANION GAP SERPL CALCULATED.3IONS-SCNC: 8 MMOL/L (ref 7–15)
BUN SERPL-MCNC: 22.3 MG/DL (ref 8–23)
CALCIUM SERPL-MCNC: 8.7 MG/DL (ref 8.8–10.4)
CHLORIDE SERPL-SCNC: 111 MMOL/L (ref 98–107)
CREAT SERPL-MCNC: 0.41 MG/DL (ref 0.51–0.95)
EGFRCR SERPLBLD CKD-EPI 2021: >90 ML/MIN/1.73M2
ERYTHROCYTE [DISTWIDTH] IN BLOOD BY AUTOMATED COUNT: 16.1 % (ref 10–15)
GLUCOSE SERPL-MCNC: 97 MG/DL (ref 70–99)
HCO3 SERPL-SCNC: 22 MMOL/L (ref 22–29)
HCT VFR BLD AUTO: 32.3 % (ref 35–47)
HGB BLD-MCNC: 9.8 G/DL (ref 11.7–15.7)
MCH RBC QN AUTO: 27.1 PG (ref 26.5–33)
MCHC RBC AUTO-ENTMCNC: 30.3 G/DL (ref 31.5–36.5)
MCV RBC AUTO: 89 FL (ref 78–100)
PLATELET # BLD AUTO: 397 10E3/UL (ref 150–450)
POTASSIUM SERPL-SCNC: 4.2 MMOL/L (ref 3.4–5.3)
RBC # BLD AUTO: 3.62 10E6/UL (ref 3.8–5.2)
SODIUM SERPL-SCNC: 141 MMOL/L (ref 135–145)
WBC # BLD AUTO: 9.2 10E3/UL (ref 4–11)

## 2024-09-24 PROCEDURE — 85027 COMPLETE CBC AUTOMATED: CPT | Mod: ORL | Performed by: INTERNAL MEDICINE

## 2024-09-24 PROCEDURE — 36415 COLL VENOUS BLD VENIPUNCTURE: CPT | Mod: ORL | Performed by: INTERNAL MEDICINE

## 2024-09-24 PROCEDURE — P9604 ONE-WAY ALLOW PRORATED TRIP: HCPCS | Mod: ORL | Performed by: INTERNAL MEDICINE

## 2024-09-24 PROCEDURE — 80048 BASIC METABOLIC PNL TOTAL CA: CPT | Mod: ORL | Performed by: INTERNAL MEDICINE

## 2024-09-25 ENCOUNTER — LAB REQUISITION (OUTPATIENT)
Dept: LAB | Facility: CLINIC | Age: 79
End: 2024-09-25
Payer: COMMERCIAL

## 2024-09-25 DIAGNOSIS — D64.9 ANEMIA, UNSPECIFIED: ICD-10-CM

## 2024-09-26 LAB
ANION GAP SERPL CALCULATED.3IONS-SCNC: 9 MMOL/L (ref 7–15)
BUN SERPL-MCNC: 25.2 MG/DL (ref 8–23)
CALCIUM SERPL-MCNC: 8.6 MG/DL (ref 8.8–10.4)
CHLORIDE SERPL-SCNC: 108 MMOL/L (ref 98–107)
CREAT SERPL-MCNC: 0.49 MG/DL (ref 0.51–0.95)
EGFRCR SERPLBLD CKD-EPI 2021: >90 ML/MIN/1.73M2
ERYTHROCYTE [DISTWIDTH] IN BLOOD BY AUTOMATED COUNT: 16.1 % (ref 10–15)
GLUCOSE SERPL-MCNC: 116 MG/DL (ref 70–99)
HCO3 SERPL-SCNC: 24 MMOL/L (ref 22–29)
HCT VFR BLD AUTO: 31 % (ref 35–47)
HGB BLD-MCNC: 9.3 G/DL (ref 11.7–15.7)
MCH RBC QN AUTO: 26.6 PG (ref 26.5–33)
MCHC RBC AUTO-ENTMCNC: 30 G/DL (ref 31.5–36.5)
MCV RBC AUTO: 89 FL (ref 78–100)
PLATELET # BLD AUTO: 391 10E3/UL (ref 150–450)
POTASSIUM SERPL-SCNC: 4.2 MMOL/L (ref 3.4–5.3)
RBC # BLD AUTO: 3.49 10E6/UL (ref 3.8–5.2)
SODIUM SERPL-SCNC: 141 MMOL/L (ref 135–145)
WBC # BLD AUTO: 8.4 10E3/UL (ref 4–11)

## 2024-09-26 PROCEDURE — 80048 BASIC METABOLIC PNL TOTAL CA: CPT | Mod: ORL | Performed by: INTERNAL MEDICINE

## 2024-09-26 PROCEDURE — 36415 COLL VENOUS BLD VENIPUNCTURE: CPT | Mod: ORL | Performed by: INTERNAL MEDICINE

## 2024-09-26 PROCEDURE — 85027 COMPLETE CBC AUTOMATED: CPT | Mod: ORL | Performed by: INTERNAL MEDICINE

## 2024-09-26 PROCEDURE — P9604 ONE-WAY ALLOW PRORATED TRIP: HCPCS | Mod: ORL | Performed by: INTERNAL MEDICINE

## 2024-09-30 ENCOUNTER — LAB REQUISITION (OUTPATIENT)
Dept: LAB | Facility: CLINIC | Age: 79
End: 2024-09-30
Payer: COMMERCIAL

## 2024-09-30 DIAGNOSIS — R65.21 SEVERE SEPSIS WITH SEPTIC SHOCK (CODE) (H): ICD-10-CM

## 2024-10-01 ENCOUNTER — LAB REQUISITION (OUTPATIENT)
Dept: LAB | Facility: CLINIC | Age: 79
End: 2024-10-01
Payer: COMMERCIAL

## 2024-10-01 DIAGNOSIS — D64.9 ANEMIA, UNSPECIFIED: ICD-10-CM

## 2024-10-11 ENCOUNTER — LAB REQUISITION (OUTPATIENT)
Dept: LAB | Facility: CLINIC | Age: 79
End: 2024-10-11
Payer: COMMERCIAL

## 2024-10-11 DIAGNOSIS — D64.9 ANEMIA, UNSPECIFIED: ICD-10-CM

## 2024-10-11 DIAGNOSIS — I10 ESSENTIAL (PRIMARY) HYPERTENSION: ICD-10-CM

## 2024-10-14 ENCOUNTER — LAB REQUISITION (OUTPATIENT)
Dept: LAB | Facility: CLINIC | Age: 79
End: 2024-10-14
Payer: COMMERCIAL

## 2024-10-14 LAB
ANION GAP SERPL CALCULATED.3IONS-SCNC: 14 MMOL/L (ref 7–15)
BUN SERPL-MCNC: 20.7 MG/DL (ref 8–23)
CALCIUM SERPL-MCNC: 8.7 MG/DL (ref 8.8–10.4)
CHLORIDE SERPL-SCNC: 101 MMOL/L (ref 98–107)
CREAT SERPL-MCNC: 0.59 MG/DL (ref 0.51–0.95)
EGFRCR SERPLBLD CKD-EPI 2021: >90 ML/MIN/1.73M2
ERYTHROCYTE [DISTWIDTH] IN BLOOD BY AUTOMATED COUNT: 15.6 % (ref 10–15)
FLUAV AG SPEC QL IA: NEGATIVE
FLUBV AG SPEC QL IA: NEGATIVE
GLUCOSE SERPL-MCNC: 108 MG/DL (ref 70–99)
HCO3 SERPL-SCNC: 21 MMOL/L (ref 22–29)
HCT VFR BLD AUTO: 30.4 % (ref 35–47)
HGB BLD-MCNC: 9.2 G/DL (ref 11.7–15.7)
MCH RBC QN AUTO: 26.4 PG (ref 26.5–33)
MCHC RBC AUTO-ENTMCNC: 30.3 G/DL (ref 31.5–36.5)
MCV RBC AUTO: 87 FL (ref 78–100)
PLATELET # BLD AUTO: 487 10E3/UL (ref 150–450)
POTASSIUM SERPL-SCNC: 4.2 MMOL/L (ref 3.4–5.3)
RBC # BLD AUTO: 3.48 10E6/UL (ref 3.8–5.2)
SODIUM SERPL-SCNC: 136 MMOL/L (ref 135–145)
WBC # BLD AUTO: 19.3 10E3/UL (ref 4–11)

## 2024-10-14 PROCEDURE — 85027 COMPLETE CBC AUTOMATED: CPT | Mod: ORL | Performed by: INTERNAL MEDICINE

## 2024-10-14 PROCEDURE — 87804 INFLUENZA ASSAY W/OPTIC: CPT | Mod: ORL | Performed by: NURSE PRACTITIONER

## 2024-10-14 PROCEDURE — 36415 COLL VENOUS BLD VENIPUNCTURE: CPT | Mod: ORL | Performed by: INTERNAL MEDICINE

## 2024-10-14 PROCEDURE — P9604 ONE-WAY ALLOW PRORATED TRIP: HCPCS | Mod: ORL | Performed by: INTERNAL MEDICINE

## 2024-10-14 PROCEDURE — 80048 BASIC METABOLIC PNL TOTAL CA: CPT | Mod: ORL | Performed by: INTERNAL MEDICINE

## 2024-11-04 ENCOUNTER — LAB REQUISITION (OUTPATIENT)
Dept: LAB | Facility: CLINIC | Age: 79
End: 2024-11-04
Payer: COMMERCIAL

## 2024-11-04 DIAGNOSIS — D64.9 ANEMIA, UNSPECIFIED: ICD-10-CM

## 2024-11-05 LAB
ERYTHROCYTE [DISTWIDTH] IN BLOOD BY AUTOMATED COUNT: 17.5 % (ref 10–15)
FERRITIN SERPL-MCNC: 160 NG/ML (ref 11–328)
FOLATE SERPL-MCNC: 5.8 NG/ML (ref 4.6–34.8)
HCT VFR BLD AUTO: 32.1 % (ref 35–47)
HGB BLD-MCNC: 9.5 G/DL (ref 11.7–15.7)
IRON BINDING CAPACITY (ROCHE): 285 UG/DL (ref 240–430)
IRON SATN MFR SERPL: 13 % (ref 15–46)
IRON SERPL-MCNC: 36 UG/DL (ref 37–145)
MCH RBC QN AUTO: 26 PG (ref 26.5–33)
MCHC RBC AUTO-ENTMCNC: 29.6 G/DL (ref 31.5–36.5)
MCV RBC AUTO: 88 FL (ref 78–100)
PLATELET # BLD AUTO: 389 10E3/UL (ref 150–450)
RBC # BLD AUTO: 3.65 10E6/UL (ref 3.8–5.2)
VIT B12 SERPL-MCNC: 555 PG/ML (ref 232–1245)
VIT D+METAB SERPL-MCNC: 26 NG/ML (ref 20–50)
WBC # BLD AUTO: 7.7 10E3/UL (ref 4–11)

## 2024-11-26 ENCOUNTER — LAB REQUISITION (OUTPATIENT)
Dept: LAB | Facility: CLINIC | Age: 79
End: 2024-11-26
Payer: COMMERCIAL

## 2024-11-26 DIAGNOSIS — R52 PAIN, UNSPECIFIED: ICD-10-CM

## 2024-11-26 DIAGNOSIS — D64.9 ANEMIA, UNSPECIFIED: ICD-10-CM

## 2024-11-26 DIAGNOSIS — I10 ESSENTIAL (PRIMARY) HYPERTENSION: ICD-10-CM

## 2024-11-27 LAB
ALBUMIN SERPL BCG-MCNC: 3.6 G/DL (ref 3.5–5.2)
ALP SERPL-CCNC: 178 U/L (ref 40–150)
ALT SERPL W P-5'-P-CCNC: 8 U/L (ref 0–50)
AST SERPL W P-5'-P-CCNC: 22 U/L (ref 0–45)
BILIRUB DIRECT SERPL-MCNC: <0.2 MG/DL (ref 0–0.3)
BILIRUB SERPL-MCNC: 0.2 MG/DL
ERYTHROCYTE [DISTWIDTH] IN BLOOD BY AUTOMATED COUNT: 16.2 % (ref 10–15)
FERRITIN SERPL-MCNC: 147 NG/ML (ref 11–328)
FOLATE SERPL-MCNC: 5.6 NG/ML (ref 4.6–34.8)
HCT VFR BLD AUTO: 36.5 % (ref 35–47)
HGB BLD-MCNC: 10.8 G/DL (ref 11.7–15.7)
IRON BINDING CAPACITY (ROCHE): 230 UG/DL (ref 240–430)
IRON SATN MFR SERPL: 12 % (ref 15–46)
IRON SERPL-MCNC: 27 UG/DL (ref 37–145)
MCH RBC QN AUTO: 25.1 PG (ref 26.5–33)
MCHC RBC AUTO-ENTMCNC: 29.6 G/DL (ref 31.5–36.5)
MCV RBC AUTO: 85 FL (ref 78–100)
PLATELET # BLD AUTO: 339 10E3/UL (ref 150–450)
PROT SERPL-MCNC: 7.9 G/DL (ref 6.4–8.3)
RBC # BLD AUTO: 4.31 10E6/UL (ref 3.8–5.2)
TSH SERPL DL<=0.005 MIU/L-ACNC: 1.85 UIU/ML (ref 0.3–4.2)
VIT B12 SERPL-MCNC: 438 PG/ML (ref 232–1245)
VIT D+METAB SERPL-MCNC: 30 NG/ML (ref 20–50)
WBC # BLD AUTO: 8.2 10E3/UL (ref 4–11)

## 2024-11-27 PROCEDURE — 82728 ASSAY OF FERRITIN: CPT | Mod: ORL | Performed by: NURSE PRACTITIONER

## 2024-11-27 PROCEDURE — 85027 COMPLETE CBC AUTOMATED: CPT | Mod: ORL | Performed by: NURSE PRACTITIONER

## 2024-11-27 PROCEDURE — 83550 IRON BINDING TEST: CPT | Mod: ORL | Performed by: NURSE PRACTITIONER

## 2024-11-27 PROCEDURE — 82306 VITAMIN D 25 HYDROXY: CPT | Mod: ORL | Performed by: NURSE PRACTITIONER

## 2024-11-27 PROCEDURE — 36415 COLL VENOUS BLD VENIPUNCTURE: CPT | Mod: ORL | Performed by: NURSE PRACTITIONER

## 2024-11-27 PROCEDURE — 82746 ASSAY OF FOLIC ACID SERUM: CPT | Mod: ORL | Performed by: NURSE PRACTITIONER

## 2024-11-27 PROCEDURE — 84443 ASSAY THYROID STIM HORMONE: CPT | Mod: ORL | Performed by: NURSE PRACTITIONER

## 2024-11-27 PROCEDURE — 82607 VITAMIN B-12: CPT | Mod: ORL | Performed by: NURSE PRACTITIONER

## 2024-11-27 PROCEDURE — 80076 HEPATIC FUNCTION PANEL: CPT | Mod: ORL | Performed by: NURSE PRACTITIONER

## 2024-12-27 ENCOUNTER — LAB REQUISITION (OUTPATIENT)
Dept: LAB | Facility: CLINIC | Age: 79
End: 2024-12-27
Payer: COMMERCIAL

## 2024-12-27 DIAGNOSIS — R19.5 OTHER FECAL ABNORMALITIES: ICD-10-CM

## 2024-12-27 LAB
C DIFF GDH STL QL IA: POSITIVE
C DIFF TOX A+B STL QL IA: POSITIVE
C DIFF TOX B STL QL: POSITIVE

## 2024-12-27 PROCEDURE — 87324 CLOSTRIDIUM AG IA: CPT | Mod: ORL

## 2024-12-27 PROCEDURE — 87493 C DIFF AMPLIFIED PROBE: CPT | Mod: ORL

## 2025-01-06 ENCOUNTER — LAB REQUISITION (OUTPATIENT)
Dept: LAB | Facility: CLINIC | Age: 80
End: 2025-01-06
Payer: COMMERCIAL

## 2025-01-06 DIAGNOSIS — N39.0 URINARY TRACT INFECTION, SITE NOT SPECIFIED: ICD-10-CM

## 2025-01-06 DIAGNOSIS — R68.89 OTHER GENERAL SYMPTOMS AND SIGNS: ICD-10-CM

## 2025-01-06 PROCEDURE — 81001 URINALYSIS AUTO W/SCOPE: CPT | Mod: ORL | Performed by: NURSE PRACTITIONER

## 2025-01-06 PROCEDURE — 87086 URINE CULTURE/COLONY COUNT: CPT | Mod: ORL | Performed by: NURSE PRACTITIONER

## 2025-01-07 LAB
ALBUMIN SERPL BCG-MCNC: 3.6 G/DL (ref 3.5–5.2)
ALBUMIN UR-MCNC: 20 MG/DL
ALP SERPL-CCNC: 130 U/L (ref 40–150)
ALT SERPL W P-5'-P-CCNC: 5 U/L (ref 0–50)
ANION GAP SERPL CALCULATED.3IONS-SCNC: 9 MMOL/L (ref 7–15)
APPEARANCE UR: ABNORMAL
AST SERPL W P-5'-P-CCNC: 23 U/L (ref 0–45)
BASOPHILS # BLD AUTO: 0.1 10E3/UL (ref 0–0.2)
BASOPHILS NFR BLD AUTO: 1 %
BILIRUB SERPL-MCNC: 0.2 MG/DL
BILIRUB UR QL STRIP: NEGATIVE
BUN SERPL-MCNC: 24.8 MG/DL (ref 8–23)
CALCIUM SERPL-MCNC: 9.6 MG/DL (ref 8.8–10.4)
CHLORIDE SERPL-SCNC: 107 MMOL/L (ref 98–107)
COLOR UR AUTO: YELLOW
CREAT SERPL-MCNC: 0.5 MG/DL (ref 0.51–0.95)
EGFRCR SERPLBLD CKD-EPI 2021: >90 ML/MIN/1.73M2
EOSINOPHIL # BLD AUTO: 0.5 10E3/UL (ref 0–0.7)
EOSINOPHIL NFR BLD AUTO: 8 %
ERYTHROCYTE [DISTWIDTH] IN BLOOD BY AUTOMATED COUNT: 16.6 % (ref 10–15)
GLUCOSE SERPL-MCNC: 90 MG/DL (ref 70–99)
GLUCOSE UR STRIP-MCNC: NEGATIVE MG/DL
HCO3 SERPL-SCNC: 24 MMOL/L (ref 22–29)
HCT VFR BLD AUTO: 39.6 % (ref 35–47)
HGB BLD-MCNC: 12.1 G/DL (ref 11.7–15.7)
HGB UR QL STRIP: NEGATIVE
IMM GRANULOCYTES # BLD: 0 10E3/UL
IMM GRANULOCYTES NFR BLD: 0 %
KETONES UR STRIP-MCNC: NEGATIVE MG/DL
LEUKOCYTE ESTERASE UR QL STRIP: ABNORMAL
LYMPHOCYTES # BLD AUTO: 2 10E3/UL (ref 0.8–5.3)
LYMPHOCYTES NFR BLD AUTO: 31 %
MCH RBC QN AUTO: 25.5 PG (ref 26.5–33)
MCHC RBC AUTO-ENTMCNC: 30.6 G/DL (ref 31.5–36.5)
MCV RBC AUTO: 84 FL (ref 78–100)
MONOCYTES # BLD AUTO: 0.4 10E3/UL (ref 0–1.3)
MONOCYTES NFR BLD AUTO: 7 %
NEUTROPHILS # BLD AUTO: 3.3 10E3/UL (ref 1.6–8.3)
NEUTROPHILS NFR BLD AUTO: 52 %
NITRATE UR QL: NEGATIVE
NRBC # BLD AUTO: 0 10E3/UL
NRBC BLD AUTO-RTO: 0 /100
PH UR STRIP: 5 [PH] (ref 5–7)
PLATELET # BLD AUTO: 363 10E3/UL (ref 150–450)
POTASSIUM SERPL-SCNC: 4.3 MMOL/L (ref 3.4–5.3)
PROT SERPL-MCNC: 7.5 G/DL (ref 6.4–8.3)
RBC # BLD AUTO: 4.74 10E6/UL (ref 3.8–5.2)
RBC URINE: 0 /HPF
SODIUM SERPL-SCNC: 140 MMOL/L (ref 135–145)
SP GR UR STRIP: 1.02 (ref 1–1.03)
UROBILINOGEN UR STRIP-MCNC: NORMAL MG/DL
WBC # BLD AUTO: 6.3 10E3/UL (ref 4–11)
WBC URINE: >100 /HPF

## 2025-01-07 PROCEDURE — P9604 ONE-WAY ALLOW PRORATED TRIP: HCPCS | Mod: ORL | Performed by: NURSE PRACTITIONER

## 2025-01-07 PROCEDURE — 85025 COMPLETE CBC W/AUTO DIFF WBC: CPT | Mod: ORL | Performed by: NURSE PRACTITIONER

## 2025-01-07 PROCEDURE — 80053 COMPREHEN METABOLIC PANEL: CPT | Mod: ORL | Performed by: NURSE PRACTITIONER

## 2025-01-07 PROCEDURE — 36415 COLL VENOUS BLD VENIPUNCTURE: CPT | Mod: ORL | Performed by: NURSE PRACTITIONER

## 2025-01-08 LAB — BACTERIA UR CULT: NORMAL

## 2025-05-03 ENCOUNTER — LAB REQUISITION (OUTPATIENT)
Dept: LAB | Facility: CLINIC | Age: 80
End: 2025-05-03
Payer: COMMERCIAL

## 2025-05-03 DIAGNOSIS — D51.9 VITAMIN B12 DEFICIENCY ANEMIA, UNSPECIFIED: ICD-10-CM

## 2025-05-03 DIAGNOSIS — D52.9 FOLATE DEFICIENCY ANEMIA, UNSPECIFIED: ICD-10-CM

## 2025-05-03 DIAGNOSIS — R73.09 OTHER ABNORMAL GLUCOSE: ICD-10-CM

## 2025-05-03 DIAGNOSIS — Z13.220 ENCOUNTER FOR SCREENING FOR LIPOID DISORDERS: ICD-10-CM

## 2025-05-03 DIAGNOSIS — R94.6 ABNORMAL RESULTS OF THYROID FUNCTION STUDIES: ICD-10-CM

## 2025-05-07 LAB
ALBUMIN SERPL BCG-MCNC: 3.5 G/DL (ref 3.5–5.2)
ALP SERPL-CCNC: 134 U/L (ref 40–150)
ALT SERPL W P-5'-P-CCNC: 10 U/L (ref 0–50)
ANION GAP SERPL CALCULATED.3IONS-SCNC: 11 MMOL/L (ref 7–15)
AST SERPL W P-5'-P-CCNC: 30 U/L (ref 0–45)
BASOPHILS # BLD AUTO: 0.1 10E3/UL (ref 0–0.2)
BASOPHILS NFR BLD AUTO: 1 %
BILIRUB SERPL-MCNC: 0.3 MG/DL
BUN SERPL-MCNC: 21.5 MG/DL (ref 8–23)
CALCIUM SERPL-MCNC: 9.2 MG/DL (ref 8.8–10.4)
CHLORIDE SERPL-SCNC: 107 MMOL/L (ref 98–107)
CREAT SERPL-MCNC: 0.44 MG/DL (ref 0.51–0.95)
EGFRCR SERPLBLD CKD-EPI 2021: >90 ML/MIN/1.73M2
EOSINOPHIL # BLD AUTO: 0.4 10E3/UL (ref 0–0.7)
EOSINOPHIL NFR BLD AUTO: 6 %
ERYTHROCYTE [DISTWIDTH] IN BLOOD BY AUTOMATED COUNT: 16.8 % (ref 10–15)
EST. AVERAGE GLUCOSE BLD GHB EST-MCNC: 100 MG/DL
FOLATE SERPL-MCNC: 6.7 NG/ML (ref 4.6–34.8)
GLUCOSE SERPL-MCNC: 78 MG/DL (ref 70–99)
HBA1C MFR BLD: 5.1 %
HCO3 SERPL-SCNC: 24 MMOL/L (ref 22–29)
HCT VFR BLD AUTO: 37.8 % (ref 35–47)
HGB BLD-MCNC: 11.5 G/DL (ref 11.7–15.7)
IMM GRANULOCYTES # BLD: 0 10E3/UL
IMM GRANULOCYTES NFR BLD: 0 %
LYMPHOCYTES # BLD AUTO: 2.3 10E3/UL (ref 0.8–5.3)
LYMPHOCYTES NFR BLD AUTO: 33 %
MCH RBC QN AUTO: 25.7 PG (ref 26.5–33)
MCHC RBC AUTO-ENTMCNC: 30.4 G/DL (ref 31.5–36.5)
MCV RBC AUTO: 84 FL (ref 78–100)
MONOCYTES # BLD AUTO: 0.4 10E3/UL (ref 0–1.3)
MONOCYTES NFR BLD AUTO: 6 %
NEUTROPHILS # BLD AUTO: 3.8 10E3/UL (ref 1.6–8.3)
NEUTROPHILS NFR BLD AUTO: 55 %
NRBC # BLD AUTO: 0 10E3/UL
NRBC BLD AUTO-RTO: 0 /100
PLATELET # BLD AUTO: 297 10E3/UL (ref 150–450)
POTASSIUM SERPL-SCNC: 4.4 MMOL/L (ref 3.4–5.3)
PROT SERPL-MCNC: 7.2 G/DL (ref 6.4–8.3)
RBC # BLD AUTO: 4.48 10E6/UL (ref 3.8–5.2)
SODIUM SERPL-SCNC: 142 MMOL/L (ref 135–145)
TSH SERPL DL<=0.005 MIU/L-ACNC: 2.08 UIU/ML (ref 0.3–4.2)
VIT B12 SERPL-MCNC: 280 PG/ML (ref 232–1245)
WBC # BLD AUTO: 6.9 10E3/UL (ref 4–11)

## 2025-05-07 PROCEDURE — 82746 ASSAY OF FOLIC ACID SERUM: CPT | Mod: ORL | Performed by: INTERNAL MEDICINE

## 2025-05-07 PROCEDURE — 82607 VITAMIN B-12: CPT | Mod: ORL | Performed by: INTERNAL MEDICINE

## 2025-05-07 PROCEDURE — 36415 COLL VENOUS BLD VENIPUNCTURE: CPT | Mod: ORL | Performed by: INTERNAL MEDICINE

## 2025-05-07 PROCEDURE — 85025 COMPLETE CBC W/AUTO DIFF WBC: CPT | Mod: ORL | Performed by: INTERNAL MEDICINE

## 2025-05-07 PROCEDURE — 84443 ASSAY THYROID STIM HORMONE: CPT | Mod: ORL | Performed by: INTERNAL MEDICINE

## 2025-05-07 PROCEDURE — 83036 HEMOGLOBIN GLYCOSYLATED A1C: CPT | Mod: ORL | Performed by: INTERNAL MEDICINE

## 2025-05-07 PROCEDURE — 80053 COMPREHEN METABOLIC PANEL: CPT | Mod: ORL | Performed by: INTERNAL MEDICINE

## 2025-05-07 PROCEDURE — P9604 ONE-WAY ALLOW PRORATED TRIP: HCPCS | Mod: ORL | Performed by: INTERNAL MEDICINE

## 2025-08-21 ENCOUNTER — LAB REQUISITION (OUTPATIENT)
Dept: LAB | Facility: CLINIC | Age: 80
End: 2025-08-21
Payer: COMMERCIAL

## 2025-08-21 DIAGNOSIS — R82.90 UNSPECIFIED ABNORMAL FINDINGS IN URINE: ICD-10-CM

## 2025-08-21 LAB
ALBUMIN UR-MCNC: NEGATIVE MG/DL
APPEARANCE UR: ABNORMAL
BACTERIA #/AREA URNS HPF: ABNORMAL /HPF
BILIRUB UR QL STRIP: NEGATIVE
CAOX CRY #/AREA URNS HPF: ABNORMAL /HPF
COLOR UR AUTO: YELLOW
GLUCOSE UR STRIP-MCNC: NEGATIVE MG/DL
HGB UR QL STRIP: ABNORMAL
KETONES UR STRIP-MCNC: NEGATIVE MG/DL
LEUKOCYTE ESTERASE UR QL STRIP: ABNORMAL
MUCOUS THREADS #/AREA URNS LPF: PRESENT /LPF
NITRATE UR QL: POSITIVE
PH UR STRIP: 6.5 [PH] (ref 5–7)
RBC URINE: 8 /HPF
SP GR UR STRIP: 1.02 (ref 1–1.03)
TRANSITIONAL EPI: <1 /HPF
UROBILINOGEN UR STRIP-MCNC: NORMAL MG/DL
WBC CLUMPS #/AREA URNS HPF: PRESENT /HPF
WBC URINE: 169 /HPF

## 2025-08-23 LAB — BACTERIA UR CULT: ABNORMAL

## (undated) DEVICE — EYE CANN IRR 25GA CYSTOTOME 581610

## (undated) DEVICE — EYE PACK CUSTOM ANTERIOR 30DEG TIP CENTURION PPK6682-04

## (undated) DEVICE — GLOVE BIOGEL PI SZ 7.5 40875

## (undated) DEVICE — SOL WATER IRRIG 500ML BOTTLE 2F7113

## (undated) DEVICE — EYE KNIFE SLIT XSTAR VISITEC 2.6MM 45DEG 373726

## (undated) DEVICE — LINEN TOWEL PACK X5 5464

## (undated) DEVICE — EYE KNIFE STILETTO VISITEC 1.1MM ANG 45DEG SIDEPORT 376620

## (undated) DEVICE — EYE TIP IRRIGATION & ASPIRATION POLYMER CVD 0.3MM 8065751512

## (undated) DEVICE — GLOVE PROTEXIS MICRO 7.5  2D73PM75

## (undated) DEVICE — EYE SHIELD PLASTIC

## (undated) DEVICE — EYE CANN IRR 27GA ANTERIOR CHAMBER 581280

## (undated) DEVICE — PACK CATARACT CUSTOM ASC SEY15CPUMC

## (undated) RX ORDER — FENTANYL CITRATE 50 UG/ML
INJECTION, SOLUTION INTRAMUSCULAR; INTRAVENOUS
Status: DISPENSED
Start: 2023-02-06

## (undated) RX ORDER — ACETAMINOPHEN 325 MG/1
TABLET ORAL
Status: DISPENSED
Start: 2023-01-16

## (undated) RX ORDER — ACETAMINOPHEN 325 MG/1
TABLET ORAL
Status: DISPENSED
Start: 2023-02-06

## (undated) RX ORDER — FENTANYL CITRATE 50 UG/ML
INJECTION, SOLUTION INTRAMUSCULAR; INTRAVENOUS
Status: DISPENSED
Start: 2023-01-16